# Patient Record
Sex: MALE | Race: WHITE
[De-identification: names, ages, dates, MRNs, and addresses within clinical notes are randomized per-mention and may not be internally consistent; named-entity substitution may affect disease eponyms.]

---

## 2017-07-31 ENCOUNTER — HOSPITAL ENCOUNTER (INPATIENT)
Dept: HOSPITAL 4 - SED | Age: 57
LOS: 3 days | Discharge: HOME | DRG: 720 | End: 2017-08-03
Payer: MEDICAID

## 2017-07-31 VITALS — SYSTOLIC BLOOD PRESSURE: 144 MMHG

## 2017-07-31 VITALS — HEIGHT: 71 IN | BODY MASS INDEX: 35 KG/M2 | WEIGHT: 250 LBS

## 2017-07-31 VITALS — SYSTOLIC BLOOD PRESSURE: 123 MMHG

## 2017-07-31 VITALS — SYSTOLIC BLOOD PRESSURE: 134 MMHG

## 2017-07-31 DIAGNOSIS — I10: ICD-10-CM

## 2017-07-31 DIAGNOSIS — E44.0: ICD-10-CM

## 2017-07-31 DIAGNOSIS — Z88.8: ICD-10-CM

## 2017-07-31 DIAGNOSIS — F41.9: ICD-10-CM

## 2017-07-31 DIAGNOSIS — F32.9: ICD-10-CM

## 2017-07-31 DIAGNOSIS — E11.610: ICD-10-CM

## 2017-07-31 DIAGNOSIS — Z88.0: ICD-10-CM

## 2017-07-31 DIAGNOSIS — A41.9: Primary | ICD-10-CM

## 2017-07-31 DIAGNOSIS — L03.115: ICD-10-CM

## 2017-07-31 DIAGNOSIS — Z88.1: ICD-10-CM

## 2017-07-31 DIAGNOSIS — E11.65: ICD-10-CM

## 2017-07-31 DIAGNOSIS — Z87.81: ICD-10-CM

## 2017-07-31 LAB
ALBUMIN SERPL BCP-MCNC: 3.3 G/DL (ref 3.4–4.8)
ALT SERPL W P-5'-P-CCNC: 20 U/L (ref 12–78)
AMYLASE SERPL-CCNC: 17 U/L (ref 0–100)
ANION GAP SERPL CALCULATED.3IONS-SCNC: 5 MMOL/L (ref 5–15)
AST SERPL W P-5'-P-CCNC: 14 U/L (ref 10–37)
BASOPHILS # BLD AUTO: 0.1 K/UL (ref 0–0.2)
BASOPHILS NFR BLD AUTO: 0.7 % (ref 0–2)
BILIRUB DIRECT SERPL-MCNC: 0.2 MG/DL (ref 0–0.3)
BILIRUB SERPL-MCNC: 0.5 MG/DL (ref 0–1)
BUN SERPL-MCNC: 12 MG/DL (ref 8–21)
CALCIUM SERPL-MCNC: 8.7 MG/DL (ref 8.4–11)
CHLORIDE SERPL-SCNC: 100 MMOL/L (ref 98–107)
CREAT SERPL-MCNC: 0.62 MG/DL (ref 0.55–1.3)
EOSINOPHIL # BLD AUTO: 0.2 K/UL (ref 0–0.4)
EOSINOPHIL NFR BLD AUTO: 1.7 % (ref 0–4)
ERYTHROCYTE [DISTWIDTH] IN BLOOD BY AUTOMATED COUNT: 13.9 % (ref 9–15)
GFR SERPL CREATININE-BSD FRML MDRD: 174 ML/MIN (ref 90–?)
GLUCOSE SERPL-MCNC: 274 MG/DL (ref 70–99)
HCT VFR BLD AUTO: 33.7 % (ref 36–54)
HGB BLD-MCNC: 11.1 G/DL (ref 14–18)
INR PPP: 1 (ref 0.8–1.2)
LIPASE SERPL-CCNC: 88 U/L (ref 73–393)
LYMPHOCYTES # BLD AUTO: 1.6 K/UL (ref 1–5.5)
LYMPHOCYTES NFR BLD AUTO: 11.1 % (ref 20.5–51.5)
MCH RBC QN AUTO: 27 PG (ref 27–31)
MCHC RBC AUTO-ENTMCNC: 33 % (ref 32–36)
MCV RBC AUTO: 81 FL (ref 79–98)
MONOCYTES # BLD MANUAL: 0.7 K/UL (ref 0–1)
MONOCYTES # BLD MANUAL: 4.7 % (ref 1.7–9.3)
NEUTROPHILS # BLD AUTO: 11.8 K/UL (ref 1.8–7.7)
NEUTROPHILS NFR BLD AUTO: 81.8 % (ref 40–70)
PHOSPHATE SERPL-MCNC: 2.8 MG/DL (ref 2.7–4.5)
PLATELET # BLD AUTO: 529 K/UL (ref 130–430)
POTASSIUM SERPL-SCNC: 4.1 MMOL/L (ref 3.5–5.1)
PROT SERPL-MCNC: 7.2 G/DL (ref 6.4–8.3)
PROTHROMBIN TIME: 10.5 SECS (ref 9.5–12.5)
RBC # BLD AUTO: 4.15 MIL/UL (ref 4.2–6.2)
SODIUM SERPLBLD-SCNC: 134 MMOL/L (ref 136–145)
T4 FREE SERPL-MCNC: 0.9 NG/DL (ref 0.6–1.6)
TSH SERPL DL<=0.05 MIU/L-ACNC: 1.31 UIU/ML (ref 0.34–4.82)
WBC # BLD AUTO: 14.4 K/UL (ref 4.8–10.8)

## 2017-07-31 RX ADMIN — ACETAMINOPHEN PRN MG: 325 TABLET ORAL at 20:44

## 2017-07-31 RX ADMIN — DOCUSATE SODIUM SCH MG: 100 CAPSULE, LIQUID FILLED ORAL at 20:45

## 2017-07-31 RX ADMIN — MORPHINE SULFATE PRN MG: 2 INJECTION, SOLUTION INTRAMUSCULAR; INTRAVENOUS at 22:01

## 2017-07-31 RX ADMIN — SODIUM CHLORIDE SCH MLS/HR: 9 INJECTION, SOLUTION INTRAVENOUS at 20:44

## 2017-08-01 VITALS — SYSTOLIC BLOOD PRESSURE: 143 MMHG

## 2017-08-01 VITALS — SYSTOLIC BLOOD PRESSURE: 146 MMHG

## 2017-08-01 VITALS — SYSTOLIC BLOOD PRESSURE: 126 MMHG

## 2017-08-01 VITALS — SYSTOLIC BLOOD PRESSURE: 142 MMHG

## 2017-08-01 VITALS — SYSTOLIC BLOOD PRESSURE: 132 MMHG

## 2017-08-01 VITALS — SYSTOLIC BLOOD PRESSURE: 135 MMHG

## 2017-08-01 LAB
ANION GAP SERPL CALCULATED.3IONS-SCNC: 5 MMOL/L (ref 5–15)
BASOPHILS # BLD AUTO: 0.1 K/UL (ref 0–0.2)
BASOPHILS NFR BLD AUTO: 0.8 % (ref 0–2)
BUN SERPL-MCNC: 11 MG/DL (ref 8–21)
CALCIUM SERPL-MCNC: 8.2 MG/DL (ref 8.4–11)
CHLORIDE SERPL-SCNC: 103 MMOL/L (ref 98–107)
CHOLEST SERPL-MCNC: 130 MG/DL (ref ?–200)
CREAT SERPL-MCNC: 0.55 MG/DL (ref 0.55–1.3)
EOSINOPHIL # BLD AUTO: 0.5 K/UL (ref 0–0.4)
EOSINOPHIL NFR BLD AUTO: 4.3 % (ref 0–4)
ERYTHROCYTE [DISTWIDTH] IN BLOOD BY AUTOMATED COUNT: 13.6 % (ref 9–15)
GFR SERPL CREATININE-BSD FRML MDRD: 200 ML/MIN (ref 90–?)
GLUCOSE SERPL-MCNC: 191 MG/DL (ref 70–99)
HCT VFR BLD AUTO: 31.4 % (ref 36–54)
HDLC SERPL-MCNC: 30 MG/DL (ref 45–?)
HGB BLD-MCNC: 10.3 G/DL (ref 14–18)
LDL CHOLESTEROL: 93 MG/DL (ref ?–100)
LYMPHOCYTES # BLD AUTO: 2.1 K/UL (ref 1–5.5)
LYMPHOCYTES NFR BLD AUTO: 18.4 % (ref 20.5–51.5)
MCH RBC QN AUTO: 27 PG (ref 27–31)
MCHC RBC AUTO-ENTMCNC: 33 % (ref 32–36)
MCV RBC AUTO: 82 FL (ref 79–98)
MONOCYTES # BLD MANUAL: 0.7 K/UL (ref 0–1)
MONOCYTES # BLD MANUAL: 6.3 % (ref 1.7–9.3)
NEUTROPHILS # BLD AUTO: 8.1 K/UL (ref 1.8–7.7)
NEUTROPHILS NFR BLD AUTO: 70.2 % (ref 40–70)
PHOSPHATE SERPL-MCNC: 4.3 MG/DL (ref 2.7–4.5)
PLATELET # BLD AUTO: 479 K/UL (ref 130–430)
POTASSIUM SERPL-SCNC: 4.3 MMOL/L (ref 3.5–5.1)
RBC # BLD AUTO: 3.84 MIL/UL (ref 4.2–6.2)
SODIUM SERPLBLD-SCNC: 136 MMOL/L (ref 136–145)
TRIGL SERPL-MCNC: 65 MG/DL (ref 30–150)
WBC # BLD AUTO: 11.5 K/UL (ref 4.8–10.8)

## 2017-08-01 RX ADMIN — DIPHENHYDRAMINE HYDROCHLORIDE PRN MG: 12.5 SOLUTION ORAL at 22:40

## 2017-08-01 RX ADMIN — CLINDAMYCIN PHOSPHATE SCH MLS/HR: 150 INJECTION, SOLUTION INTRAVENOUS at 05:38

## 2017-08-01 RX ADMIN — Medication SCH EA: at 20:28

## 2017-08-01 RX ADMIN — CLINDAMYCIN PHOSPHATE SCH MLS/HR: 150 INJECTION, SOLUTION INTRAVENOUS at 17:18

## 2017-08-01 RX ADMIN — CLINDAMYCIN PHOSPHATE SCH MLS/HR: 150 INJECTION, SOLUTION INTRAVENOUS at 23:41

## 2017-08-01 RX ADMIN — Medication SCH EA: at 11:12

## 2017-08-01 RX ADMIN — DOCUSATE SODIUM SCH MG: 100 CAPSULE, LIQUID FILLED ORAL at 09:27

## 2017-08-01 RX ADMIN — HUMAN INSULIN PRN UNITS: 100 INJECTION, SOLUTION SUBCUTANEOUS at 20:31

## 2017-08-01 RX ADMIN — DOCUSATE SODIUM SCH MG: 100 CAPSULE, LIQUID FILLED ORAL at 20:28

## 2017-08-01 RX ADMIN — MORPHINE SULFATE PRN MG: 2 INJECTION, SOLUTION INTRAMUSCULAR; INTRAVENOUS at 20:41

## 2017-08-01 RX ADMIN — Medication SCH EA: at 05:42

## 2017-08-01 RX ADMIN — CLINDAMYCIN PHOSPHATE SCH MLS/HR: 150 INJECTION, SOLUTION INTRAVENOUS at 13:00

## 2017-08-01 RX ADMIN — Medication SCH EA: at 16:44

## 2017-08-01 RX ADMIN — SODIUM CHLORIDE SCH MLS/HR: 9 INJECTION, SOLUTION INTRAVENOUS at 13:02

## 2017-08-01 RX ADMIN — HUMAN INSULIN PRN UNITS: 100 INJECTION, SOLUTION SUBCUTANEOUS at 17:14

## 2017-08-01 RX ADMIN — HUMAN INSULIN PRN UNITS: 100 INJECTION, SOLUTION SUBCUTANEOUS at 11:53

## 2017-08-01 RX ADMIN — ONDANSETRON PRN MG: 2 INJECTION INTRAMUSCULAR; INTRAVENOUS at 11:07

## 2017-08-01 RX ADMIN — HUMAN INSULIN PRN UNITS: 100 INJECTION, SOLUTION SUBCUTANEOUS at 05:56

## 2017-08-02 VITALS — SYSTOLIC BLOOD PRESSURE: 141 MMHG

## 2017-08-02 VITALS — SYSTOLIC BLOOD PRESSURE: 140 MMHG

## 2017-08-02 VITALS — SYSTOLIC BLOOD PRESSURE: 142 MMHG

## 2017-08-02 VITALS — SYSTOLIC BLOOD PRESSURE: 150 MMHG

## 2017-08-02 VITALS — SYSTOLIC BLOOD PRESSURE: 163 MMHG

## 2017-08-02 VITALS — SYSTOLIC BLOOD PRESSURE: 136 MMHG

## 2017-08-02 LAB
ANION GAP SERPL CALCULATED.3IONS-SCNC: 4 MMOL/L (ref 5–15)
BASOPHILS # BLD AUTO: 0.1 K/UL (ref 0–0.2)
BASOPHILS NFR BLD AUTO: 0.7 % (ref 0–2)
BUN SERPL-MCNC: 10 MG/DL (ref 8–21)
CALCIUM SERPL-MCNC: 8.6 MG/DL (ref 8.4–11)
CHLORIDE SERPL-SCNC: 101 MMOL/L (ref 98–107)
CREAT SERPL-MCNC: 0.53 MG/DL (ref 0.55–1.3)
EOSINOPHIL # BLD AUTO: 0.6 K/UL (ref 0–0.4)
EOSINOPHIL NFR BLD AUTO: 4.2 % (ref 0–4)
ERYTHROCYTE [DISTWIDTH] IN BLOOD BY AUTOMATED COUNT: 13.8 % (ref 9–15)
GFR SERPL CREATININE-BSD FRML MDRD: 208 ML/MIN (ref 90–?)
GLUCOSE SERPL-MCNC: 175 MG/DL (ref 70–99)
HBA1C MFR BLD: 11 % (ref 4.8–5.6)
HCT VFR BLD AUTO: 33.8 % (ref 36–54)
HGB BLD-MCNC: 11.2 G/DL (ref 14–18)
LYMPHOCYTES # BLD AUTO: 2.3 K/UL (ref 1–5.5)
LYMPHOCYTES NFR BLD AUTO: 17.2 % (ref 20.5–51.5)
MCH RBC QN AUTO: 27 PG (ref 27–31)
MCHC RBC AUTO-ENTMCNC: 33 % (ref 32–36)
MCV RBC AUTO: 81 FL (ref 79–98)
MONOCYTES # BLD MANUAL: 0.8 K/UL (ref 0–1)
MONOCYTES # BLD MANUAL: 6.1 % (ref 1.7–9.3)
NEUTROPHILS # BLD AUTO: 9.6 K/UL (ref 1.8–7.7)
NEUTROPHILS NFR BLD AUTO: 71.8 % (ref 40–70)
PHOSPHATE SERPL-MCNC: 4.3 MG/DL (ref 2.7–4.5)
PLATELET # BLD AUTO: 550 K/UL (ref 130–430)
POTASSIUM SERPL-SCNC: 4.1 MMOL/L (ref 3.5–5.1)
RBC # BLD AUTO: 4.19 MIL/UL (ref 4.2–6.2)
SODIUM SERPLBLD-SCNC: 135 MMOL/L (ref 136–145)
T3 UPTAKE: 30 % (ref 24–39)
T4 SERPL-MCNC: 8.4 UG/DL (ref 4.5–12)
WBC # BLD AUTO: 13.4 K/UL (ref 4.8–10.8)

## 2017-08-02 RX ADMIN — HUMAN INSULIN PRN UNITS: 100 INJECTION, SOLUTION SUBCUTANEOUS at 12:21

## 2017-08-02 RX ADMIN — Medication SCH EA: at 06:12

## 2017-08-02 RX ADMIN — Medication SCH EA: at 17:03

## 2017-08-02 RX ADMIN — MORPHINE SULFATE PRN MG: 2 INJECTION, SOLUTION INTRAMUSCULAR; INTRAVENOUS at 02:51

## 2017-08-02 RX ADMIN — CLINDAMYCIN PHOSPHATE SCH MLS/HR: 150 INJECTION, SOLUTION INTRAVENOUS at 17:52

## 2017-08-02 RX ADMIN — SODIUM CHLORIDE SCH MLS/HR: 9 INJECTION, SOLUTION INTRAVENOUS at 08:49

## 2017-08-02 RX ADMIN — DOCUSATE SODIUM SCH MG: 100 CAPSULE, LIQUID FILLED ORAL at 21:00

## 2017-08-02 RX ADMIN — HUMAN INSULIN PRN UNITS: 100 INJECTION, SOLUTION SUBCUTANEOUS at 06:13

## 2017-08-02 RX ADMIN — CLINDAMYCIN PHOSPHATE SCH MLS/HR: 150 INJECTION, SOLUTION INTRAVENOUS at 05:27

## 2017-08-02 RX ADMIN — HUMAN INSULIN PRN UNITS: 100 INJECTION, SOLUTION SUBCUTANEOUS at 17:08

## 2017-08-02 RX ADMIN — ACETAMINOPHEN PRN MG: 325 TABLET ORAL at 07:06

## 2017-08-02 RX ADMIN — CLINDAMYCIN PHOSPHATE SCH MLS/HR: 150 INJECTION, SOLUTION INTRAVENOUS at 12:30

## 2017-08-02 RX ADMIN — MORPHINE SULFATE PRN MG: 2 INJECTION, SOLUTION INTRAMUSCULAR; INTRAVENOUS at 21:50

## 2017-08-02 RX ADMIN — DOCUSATE SODIUM SCH MG: 100 CAPSULE, LIQUID FILLED ORAL at 08:42

## 2017-08-02 RX ADMIN — ONDANSETRON PRN MG: 2 INJECTION INTRAMUSCULAR; INTRAVENOUS at 08:54

## 2017-08-02 RX ADMIN — HUMAN INSULIN PRN UNITS: 100 INJECTION, SOLUTION SUBCUTANEOUS at 21:56

## 2017-08-02 RX ADMIN — LEVOFLOXACIN SCH MLS/HR: 5 INJECTION, SOLUTION INTRAVENOUS at 10:14

## 2017-08-02 RX ADMIN — ONDANSETRON PRN MG: 2 INJECTION INTRAMUSCULAR; INTRAVENOUS at 17:50

## 2017-08-02 RX ADMIN — Medication SCH EA: at 12:19

## 2017-08-02 RX ADMIN — Medication SCH EA: at 21:44

## 2017-08-03 ENCOUNTER — HOSPITAL ENCOUNTER (EMERGENCY)
Dept: HOSPITAL 4 - SED | Age: 57
Discharge: HOME | End: 2017-08-03
Payer: MEDICAID

## 2017-08-03 VITALS — SYSTOLIC BLOOD PRESSURE: 130 MMHG

## 2017-08-03 VITALS — WEIGHT: 275 LBS | HEIGHT: 71 IN | BODY MASS INDEX: 38.5 KG/M2

## 2017-08-03 VITALS — SYSTOLIC BLOOD PRESSURE: 147 MMHG

## 2017-08-03 VITALS — SYSTOLIC BLOOD PRESSURE: 140 MMHG

## 2017-08-03 VITALS — SYSTOLIC BLOOD PRESSURE: 125 MMHG

## 2017-08-03 VITALS — SYSTOLIC BLOOD PRESSURE: 135 MMHG

## 2017-08-03 VITALS — SYSTOLIC BLOOD PRESSURE: 131 MMHG

## 2017-08-03 DIAGNOSIS — Z88.1: ICD-10-CM

## 2017-08-03 DIAGNOSIS — Z79.84: ICD-10-CM

## 2017-08-03 DIAGNOSIS — E11.9: ICD-10-CM

## 2017-08-03 DIAGNOSIS — Z88.0: ICD-10-CM

## 2017-08-03 DIAGNOSIS — Z79.899: ICD-10-CM

## 2017-08-03 DIAGNOSIS — L03.115: Primary | ICD-10-CM

## 2017-08-03 LAB
ALBUMIN SERPL BCP-MCNC: 3.4 G/DL (ref 3.4–4.8)
ALT SERPL W P-5'-P-CCNC: 20 U/L (ref 12–78)
ANION GAP SERPL CALCULATED.3IONS-SCNC: 5 MMOL/L (ref 5–15)
ANION GAP SERPL CALCULATED.3IONS-SCNC: 9 MMOL/L (ref 5–15)
AST SERPL W P-5'-P-CCNC: 15 U/L (ref 10–37)
BASOPHILS # BLD AUTO: 0.1 K/UL (ref 0–0.2)
BASOPHILS # BLD AUTO: 0.2 K/UL (ref 0–0.2)
BASOPHILS NFR BLD AUTO: 0.7 % (ref 0–2)
BASOPHILS NFR BLD AUTO: 1 % (ref 0–2)
BILIRUB SERPL-MCNC: 0.6 MG/DL (ref 0–1)
BUN SERPL-MCNC: 11 MG/DL (ref 8–21)
BUN SERPL-MCNC: 13 MG/DL (ref 8–21)
CALCIUM SERPL-MCNC: 8.6 MG/DL (ref 8.4–11)
CALCIUM SERPL-MCNC: 9.2 MG/DL (ref 8.4–11)
CHLORIDE SERPL-SCNC: 101 MMOL/L (ref 98–107)
CHLORIDE SERPL-SCNC: 103 MMOL/L (ref 98–107)
CREAT SERPL-MCNC: 0.6 MG/DL (ref 0.55–1.3)
CREAT SERPL-MCNC: 0.71 MG/DL (ref 0.55–1.3)
EOSINOPHIL # BLD AUTO: 0.1 K/UL (ref 0–0.4)
EOSINOPHIL # BLD AUTO: 0.4 K/UL (ref 0–0.4)
EOSINOPHIL NFR BLD AUTO: 0.8 % (ref 0–4)
EOSINOPHIL NFR BLD AUTO: 3.4 % (ref 0–4)
ERYTHROCYTE [DISTWIDTH] IN BLOOD BY AUTOMATED COUNT: 13.9 % (ref 9–15)
ERYTHROCYTE [DISTWIDTH] IN BLOOD BY AUTOMATED COUNT: 14.1 % (ref 9–15)
GFR SERPL CREATININE-BSD FRML MDRD: 149 ML/MIN (ref 90–?)
GFR SERPL CREATININE-BSD FRML MDRD: 181 ML/MIN (ref 90–?)
GLUCOSE SERPL-MCNC: 156 MG/DL (ref 70–99)
GLUCOSE SERPL-MCNC: 185 MG/DL (ref 70–99)
HCT VFR BLD AUTO: 32.7 % (ref 36–54)
HCT VFR BLD AUTO: 38.3 % (ref 36–54)
HGB BLD-MCNC: 10.7 G/DL (ref 14–18)
HGB BLD-MCNC: 12.3 G/DL (ref 14–18)
INR PPP: 1.1 (ref 0.8–1.2)
LYMPHOCYTES # BLD AUTO: 1.7 K/UL (ref 1–5.5)
LYMPHOCYTES # BLD AUTO: 2.1 K/UL (ref 1–5.5)
LYMPHOCYTES NFR BLD AUTO: 17.7 % (ref 20.5–51.5)
LYMPHOCYTES NFR BLD AUTO: 9.9 % (ref 20.5–51.5)
MCH RBC QN AUTO: 26 PG (ref 27–31)
MCH RBC QN AUTO: 27 PG (ref 27–31)
MCHC RBC AUTO-ENTMCNC: 32 % (ref 32–36)
MCHC RBC AUTO-ENTMCNC: 33 % (ref 32–36)
MCV RBC AUTO: 82 FL (ref 79–98)
MCV RBC AUTO: 82 FL (ref 79–98)
MONOCYTES # BLD MANUAL: 0.7 K/UL (ref 0–1)
MONOCYTES # BLD MANUAL: 0.8 K/UL (ref 0–1)
MONOCYTES # BLD MANUAL: 3.9 % (ref 1.7–9.3)
MONOCYTES # BLD MANUAL: 6.7 % (ref 1.7–9.3)
NEUTROPHILS # BLD AUTO: 14.6 K/UL (ref 1.8–7.7)
NEUTROPHILS # BLD AUTO: 8.6 K/UL (ref 1.8–7.7)
NEUTROPHILS NFR BLD AUTO: 71.5 % (ref 40–70)
NEUTROPHILS NFR BLD AUTO: 84.4 % (ref 40–70)
PLATELET # BLD AUTO: 526 K/UL (ref 130–430)
PLATELET # BLD AUTO: 638 K/UL (ref 130–430)
POTASSIUM SERPL-SCNC: 4 MMOL/L (ref 3.5–5.1)
POTASSIUM SERPL-SCNC: 4.4 MMOL/L (ref 3.5–5.1)
PROT SERPL-MCNC: 7.7 G/DL (ref 6.4–8.3)
PROTHROMBIN TIME: 11.5 SECS (ref 9.5–12.5)
RBC # BLD AUTO: 3.98 MIL/UL (ref 4.2–6.2)
RBC # BLD AUTO: 4.69 MIL/UL (ref 4.2–6.2)
SODIUM SERPLBLD-SCNC: 136 MMOL/L (ref 136–145)
SODIUM SERPLBLD-SCNC: 137 MMOL/L (ref 136–145)
WBC # BLD AUTO: 12 K/UL (ref 4.8–10.8)
WBC # BLD AUTO: 17.3 K/UL (ref 4.8–10.8)

## 2017-08-03 RX ADMIN — MORPHINE SULFATE PRN MG: 2 INJECTION, SOLUTION INTRAMUSCULAR; INTRAVENOUS at 04:49

## 2017-08-03 RX ADMIN — DOCUSATE SODIUM SCH MG: 100 CAPSULE, LIQUID FILLED ORAL at 09:00

## 2017-08-03 RX ADMIN — DOCUSATE SODIUM SCH MG: 100 CAPSULE, LIQUID FILLED ORAL at 09:25

## 2017-08-03 RX ADMIN — CLINDAMYCIN PHOSPHATE SCH MLS/HR: 150 INJECTION, SOLUTION INTRAVENOUS at 01:21

## 2017-08-03 RX ADMIN — HUMAN INSULIN PRN UNITS: 100 INJECTION, SOLUTION SUBCUTANEOUS at 06:31

## 2017-08-03 RX ADMIN — SODIUM CHLORIDE SCH MLS/HR: 9 INJECTION, SOLUTION INTRAVENOUS at 04:47

## 2017-08-03 RX ADMIN — CLINDAMYCIN PHOSPHATE SCH MLS/HR: 150 INJECTION, SOLUTION INTRAVENOUS at 06:23

## 2017-08-03 RX ADMIN — Medication SCH EA: at 06:28

## 2017-08-03 RX ADMIN — LEVOFLOXACIN SCH MLS/HR: 5 INJECTION, SOLUTION INTRAVENOUS at 09:25

## 2017-08-03 RX ADMIN — DIPHENHYDRAMINE HYDROCHLORIDE PRN MG: 12.5 SOLUTION ORAL at 01:24

## 2018-01-04 ENCOUNTER — HOSPITAL ENCOUNTER (INPATIENT)
Dept: HOSPITAL 26 - MED | Age: 58
LOS: 3 days | Discharge: NURSING, REHAB OR LONG-TERM CARE | DRG: 469 | End: 2018-01-07
Payer: MEDICAID

## 2018-01-04 VITALS — DIASTOLIC BLOOD PRESSURE: 72 MMHG | SYSTOLIC BLOOD PRESSURE: 136 MMHG

## 2018-01-04 VITALS — HEIGHT: 71 IN | BODY MASS INDEX: 31.22 KG/M2 | WEIGHT: 223 LBS

## 2018-01-04 DIAGNOSIS — Z79.4: ICD-10-CM

## 2018-01-04 DIAGNOSIS — Z86.19: ICD-10-CM

## 2018-01-04 DIAGNOSIS — E11.22: ICD-10-CM

## 2018-01-04 DIAGNOSIS — J44.9: ICD-10-CM

## 2018-01-04 DIAGNOSIS — D64.9: ICD-10-CM

## 2018-01-04 DIAGNOSIS — I50.9: ICD-10-CM

## 2018-01-04 DIAGNOSIS — K58.0: ICD-10-CM

## 2018-01-04 DIAGNOSIS — Z89.511: ICD-10-CM

## 2018-01-04 DIAGNOSIS — D68.59: ICD-10-CM

## 2018-01-04 DIAGNOSIS — E86.0: ICD-10-CM

## 2018-01-04 DIAGNOSIS — F32.9: ICD-10-CM

## 2018-01-04 DIAGNOSIS — E44.0: ICD-10-CM

## 2018-01-04 DIAGNOSIS — N18.9: ICD-10-CM

## 2018-01-04 DIAGNOSIS — E66.9: ICD-10-CM

## 2018-01-04 DIAGNOSIS — N17.0: Primary | ICD-10-CM

## 2018-01-04 DIAGNOSIS — Z91.018: ICD-10-CM

## 2018-01-04 DIAGNOSIS — I13.0: ICD-10-CM

## 2018-01-04 DIAGNOSIS — Z88.1: ICD-10-CM

## 2018-01-04 DIAGNOSIS — E78.5: ICD-10-CM

## 2018-01-04 NOTE — NUR
57Y/M BIBA C/O DIARRHEA. 

ABD IS ROUND, SOFT, NON TENDER, HYPERACTIVE BS X4. PT STATES HE HAS HAD 
DIARRHEA FOR A 'FEW MONTTHS". PT STATES HE HAS BEEN ORAL VANCO AT [facility] 
W/ NO RELIEF OF SYMPTOMS. PT IS NO LONGER TAKING VANCO AT THIS TIME. 

HX DM, COPD, CHF, AMPUTEE ABOVE KNEE RT LEG. 

ALLERGY TO ERYTHROMYCIN, AND "NUTRISWEET" OR "DIET SUGAR PRODUCTS"

## 2018-01-05 VITALS — DIASTOLIC BLOOD PRESSURE: 67 MMHG | SYSTOLIC BLOOD PRESSURE: 133 MMHG

## 2018-01-05 VITALS — DIASTOLIC BLOOD PRESSURE: 71 MMHG | SYSTOLIC BLOOD PRESSURE: 141 MMHG

## 2018-01-05 VITALS — SYSTOLIC BLOOD PRESSURE: 142 MMHG | DIASTOLIC BLOOD PRESSURE: 85 MMHG

## 2018-01-05 VITALS — DIASTOLIC BLOOD PRESSURE: 77 MMHG | SYSTOLIC BLOOD PRESSURE: 154 MMHG

## 2018-01-05 LAB
ALBUMIN FLD-MCNC: 3.2 G/DL (ref 3.4–5)
AMYLASE SERPL-CCNC: 36 U/L (ref 25–115)
ANION GAP SERPL CALCULATED.3IONS-SCNC: 12.2 MMOL/L (ref 8–16)
ANION GAP SERPL CALCULATED.3IONS-SCNC: 13.9 MMOL/L (ref 8–16)
APPEARANCE UR: CLEAR
AST SERPL-CCNC: 14 U/L (ref 15–37)
BARBITURATES UR QL SCN: (no result) NG/ML
BASOPHILS # BLD AUTO: 0.2 K/UL (ref 0–0.22)
BASOPHILS # BLD AUTO: 0.3 K/UL (ref 0–0.22)
BASOPHILS NFR BLD AUTO: 2.1 % (ref 0–2)
BASOPHILS NFR BLD AUTO: 2.7 % (ref 0–2)
BENZODIAZ UR QL SCN: (no result) NG/ML
BILIRUB SERPL-MCNC: 0.2 MG/DL (ref 0–1)
BILIRUB UR QL STRIP: NEGATIVE
BUN SERPL-MCNC: 31 MG/DL (ref 7–18)
BUN SERPL-MCNC: 31 MG/DL (ref 7–18)
BZE UR QL SCN: (no result) NG/ML
CANNABINOIDS UR QL SCN: (no result) NG/ML
CHLORIDE SERPL-SCNC: 105 MMOL/L (ref 98–107)
CHLORIDE SERPL-SCNC: 106 MMOL/L (ref 98–107)
CHOLEST/HDLC SERPL: 5.1 {RATIO} (ref 1–4.5)
CO2 SERPL-SCNC: 25.1 MMOL/L (ref 21–32)
CO2 SERPL-SCNC: 25.9 MMOL/L (ref 21–32)
COLOR UR: YELLOW
CREAT SERPL-MCNC: 1.2 MG/DL (ref 0.7–1.3)
CREAT SERPL-MCNC: 1.7 MG/DL (ref 0.7–1.3)
EOSINOPHIL # BLD AUTO: 0.4 K/UL (ref 0–0.4)
EOSINOPHIL # BLD AUTO: 0.4 K/UL (ref 0–0.4)
EOSINOPHIL NFR BLD AUTO: 3.4 % (ref 0–4)
EOSINOPHIL NFR BLD AUTO: 5 % (ref 0–4)
ERYTHROCYTE [DISTWIDTH] IN BLOOD BY AUTOMATED COUNT: 15.4 % (ref 11.6–13.7)
ERYTHROCYTE [DISTWIDTH] IN BLOOD BY AUTOMATED COUNT: 15.6 % (ref 11.6–13.7)
GFR SERPL CREATININE-BSD FRML MDRD: 54 ML/MIN (ref 90–?)
GFR SERPL CREATININE-BSD FRML MDRD: 81 ML/MIN (ref 90–?)
GLUCOSE SERPL-MCNC: 125 MG/DL (ref 74–106)
GLUCOSE SERPL-MCNC: 127 MG/DL (ref 74–106)
GLUCOSE UR STRIP-MCNC: NEGATIVE MG/DL
HCT VFR BLD AUTO: 34.3 % (ref 36–52)
HCT VFR BLD AUTO: 34.3 % (ref 36–52)
HDLC SERPL-MCNC: 31 MG/DL (ref 40–60)
HGB BLD-MCNC: 10.9 G/DL (ref 12–18)
HGB BLD-MCNC: 11.1 G/DL (ref 12–18)
HGB UR QL STRIP: NEGATIVE
LDLC SERPL CALC-MCNC: 83 MG/DL (ref 60–100)
LEUKOCYTE ESTERASE UR QL STRIP: NEGATIVE
LIPASE SERPL-CCNC: 242 U/L (ref 73–393)
LYMPHOCYTES # BLD AUTO: 1.4 K/UL (ref 2–11.5)
LYMPHOCYTES # BLD AUTO: 1.6 K/UL (ref 2–11.5)
LYMPHOCYTES NFR BLD AUTO: 15.3 % (ref 20.5–51.1)
LYMPHOCYTES NFR BLD AUTO: 16.6 % (ref 20.5–51.1)
MAGNESIUM SERPL-MCNC: 2 MG/DL (ref 1.8–2.4)
MAGNESIUM SERPL-MCNC: 2 MG/DL (ref 1.8–2.4)
MCH RBC QN AUTO: 27 PG (ref 27–31)
MCH RBC QN AUTO: 27 PG (ref 27–31)
MCHC RBC AUTO-ENTMCNC: 32 G/DL (ref 33–37)
MCHC RBC AUTO-ENTMCNC: 32 G/DL (ref 33–37)
MCV RBC AUTO: 84 FL (ref 80–94)
MCV RBC AUTO: 84 FL (ref 80–94)
MONOCYTES # BLD AUTO: 0.8 K/UL (ref 0.8–1)
MONOCYTES # BLD AUTO: 0.9 K/UL (ref 0.8–1)
MONOCYTES NFR BLD AUTO: 8.1 % (ref 1.7–9.3)
MONOCYTES NFR BLD AUTO: 8.9 % (ref 1.7–9.3)
NEUTROPHILS # BLD AUTO: 5.9 K/UL (ref 1.8–7.7)
NEUTROPHILS # BLD AUTO: 7.5 K/UL (ref 1.8–7.7)
NEUTROPHILS NFR BLD AUTO: 67.4 % (ref 42.2–75.2)
NEUTROPHILS NFR BLD AUTO: 70.5 % (ref 42.2–75.2)
NITRITE UR QL STRIP: NEGATIVE
OPIATES UR QL SCN: (no result) NG/ML
PCP UR QL SCN: (no result) NG/ML
PH UR STRIP: 6.5 [PH] (ref 5–9)
PHOSPHATE SERPL-MCNC: 4.5 MG/DL (ref 2.5–4.9)
PHOSPHATE SERPL-MCNC: 4.8 MG/DL (ref 2.5–4.9)
PLATELET # BLD AUTO: 277 K/UL (ref 140–450)
PLATELET # BLD AUTO: 315 K/UL (ref 140–450)
POTASSIUM SERPL-SCNC: 4 MMOL/L (ref 3.5–5.1)
POTASSIUM SERPL-SCNC: 4.1 MMOL/L (ref 3.5–5.1)
PROTHROMBIN TIME: 9.9 SECS (ref 10.8–13.4)
RBC # BLD AUTO: 4.06 MIL/UL (ref 4.2–6.1)
RBC # BLD AUTO: 4.1 MIL/UL (ref 4.2–6.1)
SODIUM SERPL-SCNC: 140 MMOL/L (ref 136–145)
SODIUM SERPL-SCNC: 140 MMOL/L (ref 136–145)
T4 FREE SERPL-MCNC: 0.93 NG/DL (ref 0.76–1.46)
TRIGL SERPL-MCNC: 225 MG/DL (ref 30–150)
TSH SERPL DL<=0.05 MIU/L-ACNC: 3.03 UIU/ML (ref 0.34–3.74)
WBC # BLD AUTO: 10.7 K/UL (ref 4.8–10.8)
WBC # BLD AUTO: 8.7 K/UL (ref 4.8–10.8)

## 2018-01-05 RX ADMIN — AMITRIPTYLINE HYDROCHLORIDE SCH MG: 25 TABLET, FILM COATED ORAL at 21:57

## 2018-01-05 RX ADMIN — SODIUM CHLORIDE SCH MLS/HR: 9 INJECTION, SOLUTION INTRAVENOUS at 03:40

## 2018-01-05 RX ADMIN — FERROUS SULFATE TAB 325 MG (65 MG ELEMENTAL FE) SCH MG: 325 (65 FE) TAB at 17:22

## 2018-01-05 RX ADMIN — DEXTROSE SCH MG: 50 INJECTION, SOLUTION INTRAVENOUS at 17:22

## 2018-01-05 RX ADMIN — Medication SCH DEV: at 12:19

## 2018-01-05 RX ADMIN — Medication SCH DEV: at 06:22

## 2018-01-05 RX ADMIN — Medication SCH EACH: at 09:00

## 2018-01-05 RX ADMIN — DEXTROSE SCH MG: 50 INJECTION, SOLUTION INTRAVENOUS at 12:18

## 2018-01-05 RX ADMIN — MORPHINE SULFATE PRN MG: 2 INJECTION, SOLUTION INTRAMUSCULAR; INTRAVENOUS at 21:30

## 2018-01-05 RX ADMIN — METRONIDAZOLE SCH MLS/HR: 500 SOLUTION INTRAVENOUS at 13:54

## 2018-01-05 RX ADMIN — Medication SCH DEV: at 21:00

## 2018-01-05 RX ADMIN — Medication SCH DEV: at 17:22

## 2018-01-05 RX ADMIN — Medication SCH MG: at 17:22

## 2018-01-05 RX ADMIN — SODIUM CHLORIDE SCH MLS/HR: 9 INJECTION, SOLUTION INTRAVENOUS at 12:18

## 2018-01-05 RX ADMIN — MORPHINE SULFATE PRN MG: 2 INJECTION, SOLUTION INTRAMUSCULAR; INTRAVENOUS at 14:43

## 2018-01-05 RX ADMIN — SODIUM CHLORIDE SCH MLS/HR: 9 INJECTION, SOLUTION INTRAVENOUS at 21:59

## 2018-01-05 RX ADMIN — POLYETHYLENE GLYCOL 3350 SCH GM: 17 POWDER, FOR SOLUTION ORAL at 21:00

## 2018-01-05 RX ADMIN — DEXTROSE SCH MG: 50 INJECTION, SOLUTION INTRAVENOUS at 06:22

## 2018-01-05 RX ADMIN — METRONIDAZOLE SCH MLS/HR: 500 SOLUTION INTRAVENOUS at 21:31

## 2018-01-05 RX ADMIN — ZOLPIDEM TARTRATE SCH MG: 5 TABLET ORAL at 21:57

## 2018-01-05 NOTE — NUR
PT ARRIVED TO UNIT VIA GURNEY. RECEIVED REPORT FROM ER RN CÉSAR, PT A/OX4 ON ROOM AIR. IV 
TO LEFT AC 20G INFUSING NS@100. ERYTHEMA AT ABDOMINAL/PERINEAL SKIN FOLDS, OTHERWISE SKIN 
INTACT. VITAL SIGNS WNL, UPDATED BOARD. ORIENTED PT TO UNIT. OBTAINED MRSA SWAB AND PLACED 
TELE MONITOR. PT IN STABLE CONDITION, NO SIGNS OF DISTRESS NOTED. BED IN LOW POSITION, CALL 
LIGHT WITHIN REACH. WILL CONTINUE TO MONITOR.

## 2018-01-05 NOTE — NUR
PT COMPLAINING OF PAIN AT IV SITE AND REQUESTED IV TO BE MOVED SOMEWHERE ELSE. STARTED NEW 
20G IV TO LEFT FOREARM. PT TOLERATED WELL. REMOVED IV AT LEFT AC, CANULA INTACT.

## 2018-01-05 NOTE — NUR
Patient will be admitted to care of DR. ABBASI. Admited to TELE.  Will go to 
rooM 116. Belongings list completed.  Report to ESTEPHANIE

## 2018-01-05 NOTE — NUR
PATIENT IS CURRENTLY AWAKE ALERT ORIENTED IVF INFUSING WELL IV SITE PATENT PATIENT IS 
CURRENTLY WATCHING TV.PATIENT IS CURRENTLY CLEAN AND DRY. DRINKING WATER AND ABLE TO MAKE 
NEEDS KNOWN.CALL LIGHT WITHIN REACH WILL CONTINUE TO MONITOR.

## 2018-01-05 NOTE — NUR
CLYDE NOTE

FAXED CLINICAL UPDATE TO [facility] [phone]. PER ROSCOE OF [facility] PH# 
[phone], PATIENT CAN GO BACK TO [facility] [address] WHEN READY FOR DISCHARGE AS LONG AS NO 
ISOLATION.

PER CLYDE KNAPP OF [facility] PH# [phone], SHE WILL WORK ON THE AUTHORIZATION FOR [facility] 
AND MEDICAL TRANSPORT AUTHORIZATION FOR [place] PH# [phone].  I GAVE HER THE NUMBER TO 
THE CHARGE NURSE ON THE NURSING FLOOR WHERE THE PATIENT IS IN CASE THE AUTHORIZATIONS WILL 
BE AVAILABLE AT A LATER TIME.

CHARGE NURSE HERBERT MORILLO.

## 2018-01-05 NOTE — NUR
EDUCATION GIVEN ON HIS ROUTINE NIGHTTIME MEDICATIONS PATIENT VERBALIZES UNDERSTANDING AND 
TOOK ALL HIS ROUTINE MEDS EXCEPT FOR MIRALAX PATIENT REFUSED IT.NEED MET WILL CONTINUE TO 
MONITOR.

## 2018-01-05 NOTE — NUR
PATIENT HAS BEEN SCREENED AND CATEGORIZED AS HIGH NUTRITION RISK.  PT WILL BE SEEN WITHIN 
1-2 DAYS OF ADMISSION.



1/5/18-1/6/18



AWA [address]

## 2018-01-05 NOTE — NUR
PT UNABLE TO PROVIDE STOOL SAMPLE AT THIS TIME, PT STATES HE "DOSNT HAVE TO GO" 
AND ONLY HAS FLATUS.

## 2018-01-05 NOTE — NUR
RECEIVED REPORT FROM NIGHT SHIFT RN. PATIENT IS ASLEEP BUT EASILY AWAKES. ORIENTED X4. 
RESPIRATORY EFFORT EVEN AND UNLABORED. NO SIGNS AND SYMPTOMS OF ACUTE DISTRESS NOTED AT THIS 
TIME. PATIENT HAS IV TO LEFT FA 20G INFUSING NS  ML/HR. SITE IS CLEAN, DRY, PATENT AND 
INTACT. DISCUSSED PLAN OF CARE WITH PATIENT AND HE VERBALIZED UNDERSTANDING. BED IN LOWEST 
POSITION, SIDE RAILS UP X2, CALL LIGHT PLACED WITHIN REACH. WILL CONTINUE TO MONITOR.

## 2018-01-05 NOTE — NUR
CLYDE NOTE

PER  STEVE, REVIEWS SHOULD ONLY BE SENT TO [facility].

INITIAL REVIEW FAXED TO [facility] [phone] # [phone].

-------------------------------------------------------------------------------

Addendum: 01/05/18 at 1127 by Camille Burris 

-------------------------------------------------------------------------------

SHIV KNAPP # [phone]

## 2018-01-06 VITALS — DIASTOLIC BLOOD PRESSURE: 71 MMHG | SYSTOLIC BLOOD PRESSURE: 130 MMHG

## 2018-01-06 VITALS — SYSTOLIC BLOOD PRESSURE: 129 MMHG | DIASTOLIC BLOOD PRESSURE: 76 MMHG

## 2018-01-06 VITALS — SYSTOLIC BLOOD PRESSURE: 140 MMHG | DIASTOLIC BLOOD PRESSURE: 76 MMHG

## 2018-01-06 LAB
ANION GAP SERPL CALCULATED.3IONS-SCNC: 10.8 MMOL/L (ref 8–16)
BASOPHILS # BLD AUTO: 0.1 K/UL (ref 0–0.22)
BASOPHILS NFR BLD AUTO: 2 % (ref 0–2)
BUN SERPL-MCNC: 19 MG/DL (ref 7–18)
CHLORIDE SERPL-SCNC: 107 MMOL/L (ref 98–107)
CO2 SERPL-SCNC: 26.3 MMOL/L (ref 21–32)
CREAT SERPL-MCNC: 0.9 MG/DL (ref 0.7–1.3)
EOSINOPHIL # BLD AUTO: 0.3 K/UL (ref 0–0.4)
EOSINOPHIL NFR BLD AUTO: 5 % (ref 0–4)
ERYTHROCYTE [DISTWIDTH] IN BLOOD BY AUTOMATED COUNT: 15.3 % (ref 11.6–13.7)
GFR SERPL CREATININE-BSD FRML MDRD: 113 ML/MIN (ref 90–?)
GLUCOSE SERPL-MCNC: 103 MG/DL (ref 74–106)
HCT VFR BLD AUTO: 34.4 % (ref 36–52)
HGB BLD-MCNC: 11.3 G/DL (ref 12–18)
LYMPHOCYTES # BLD AUTO: 1.5 K/UL (ref 2–11.5)
LYMPHOCYTES NFR BLD AUTO: 21.7 % (ref 20.5–51.1)
MAGNESIUM SERPL-MCNC: 2 MG/DL (ref 1.8–2.4)
MCH RBC QN AUTO: 27 PG (ref 27–31)
MCHC RBC AUTO-ENTMCNC: 33 G/DL (ref 33–37)
MCV RBC AUTO: 83 FL (ref 80–94)
MONOCYTES # BLD AUTO: 0.6 K/UL (ref 0.8–1)
MONOCYTES NFR BLD AUTO: 9.2 % (ref 1.7–9.3)
NEUTROPHILS # BLD AUTO: 4.3 K/UL (ref 1.8–7.7)
NEUTROPHILS NFR BLD AUTO: 62.1 % (ref 42.2–75.2)
PHOSPHATE SERPL-MCNC: 5.3 MG/DL (ref 2.5–4.9)
PLATELET # BLD AUTO: 261 K/UL (ref 140–450)
POTASSIUM SERPL-SCNC: 4.1 MMOL/L (ref 3.5–5.1)
RBC # BLD AUTO: 4.16 MIL/UL (ref 4.2–6.1)
SODIUM SERPL-SCNC: 140 MMOL/L (ref 136–145)
T3RU NFR SERPL: 27 % (ref 24–39)
T4 SERPL-MCNC: 7.1 UG/DL (ref 4.5–12)
WBC # BLD AUTO: 6.8 K/UL (ref 4.8–10.8)

## 2018-01-06 RX ADMIN — DEXTROSE SCH MG: 50 INJECTION, SOLUTION INTRAVENOUS at 13:26

## 2018-01-06 RX ADMIN — Medication SCH DEV: at 21:12

## 2018-01-06 RX ADMIN — Medication SCH MG: at 15:43

## 2018-01-06 RX ADMIN — SENNOSIDES A AND B SCH MG: 8.6 TABLET, FILM COATED ORAL at 17:24

## 2018-01-06 RX ADMIN — Medication SCH MG: at 10:42

## 2018-01-06 RX ADMIN — POLYETHYLENE GLYCOL 3350 SCH GM: 17 POWDER, FOR SOLUTION ORAL at 21:08

## 2018-01-06 RX ADMIN — MORPHINE SULFATE PRN MG: 2 INJECTION, SOLUTION INTRAMUSCULAR; INTRAVENOUS at 15:44

## 2018-01-06 RX ADMIN — VALSARTAN SCH MG: 80 TABLET ORAL at 09:24

## 2018-01-06 RX ADMIN — Medication SCH GM: at 09:24

## 2018-01-06 RX ADMIN — SENNOSIDES A AND B SCH MG: 8.6 TABLET, FILM COATED ORAL at 12:49

## 2018-01-06 RX ADMIN — LACTULOSE SCH GM: 10 SOLUTION ORAL at 21:10

## 2018-01-06 RX ADMIN — POLYETHYLENE GLYCOL 3350 SCH GM: 17 POWDER, FOR SOLUTION ORAL at 09:00

## 2018-01-06 RX ADMIN — Medication SCH DEV: at 16:38

## 2018-01-06 RX ADMIN — PANTOPRAZOLE SODIUM SCH MG: 40 TABLET, DELAYED RELEASE ORAL at 06:10

## 2018-01-06 RX ADMIN — DEXTROSE SCH MG: 50 INJECTION, SOLUTION INTRAVENOUS at 17:25

## 2018-01-06 RX ADMIN — Medication SCH GM: at 15:43

## 2018-01-06 RX ADMIN — ZOLPIDEM TARTRATE SCH MG: 5 TABLET ORAL at 21:08

## 2018-01-06 RX ADMIN — DEXTROSE SCH MG: 50 INJECTION, SOLUTION INTRAVENOUS at 00:35

## 2018-01-06 RX ADMIN — METOPROLOL SUCCINATE SCH MG: 50 TABLET, EXTENDED RELEASE ORAL at 09:25

## 2018-01-06 RX ADMIN — METRONIDAZOLE SCH MLS/HR: 500 SOLUTION INTRAVENOUS at 05:25

## 2018-01-06 RX ADMIN — METRONIDAZOLE SCH MLS/HR: 500 SOLUTION INTRAVENOUS at 12:48

## 2018-01-06 RX ADMIN — Medication SCH MG: at 09:25

## 2018-01-06 RX ADMIN — POTASSIUM CHLORIDE SCH MEQ: 750 TABLET, FILM COATED, EXTENDED RELEASE ORAL at 09:24

## 2018-01-06 RX ADMIN — MORPHINE SULFATE PRN MG: 2 INJECTION, SOLUTION INTRAMUSCULAR; INTRAVENOUS at 10:42

## 2018-01-06 RX ADMIN — Medication SCH MG: at 06:10

## 2018-01-06 RX ADMIN — Medication SCH DEV: at 06:14

## 2018-01-06 RX ADMIN — DEXTROSE SCH MG: 50 INJECTION, SOLUTION INTRAVENOUS at 06:09

## 2018-01-06 RX ADMIN — FERROUS SULFATE TAB 325 MG (65 MG ELEMENTAL FE) SCH MG: 325 (65 FE) TAB at 17:25

## 2018-01-06 RX ADMIN — METRONIDAZOLE SCH MLS/HR: 500 SOLUTION INTRAVENOUS at 20:57

## 2018-01-06 RX ADMIN — AMITRIPTYLINE HYDROCHLORIDE SCH MG: 25 TABLET, FILM COATED ORAL at 21:08

## 2018-01-06 RX ADMIN — Medication SCH EACH: at 09:24

## 2018-01-06 RX ADMIN — SERTRALINE HYDROCHLORIDE SCH MG: 50 TABLET ORAL at 09:24

## 2018-01-06 RX ADMIN — SODIUM CHLORIDE SCH MLS/HR: 9 INJECTION, SOLUTION INTRAVENOUS at 06:10

## 2018-01-06 RX ADMIN — FERROUS SULFATE TAB 325 MG (65 MG ELEMENTAL FE) SCH MG: 325 (65 FE) TAB at 09:24

## 2018-01-06 RX ADMIN — Medication SCH DEV: at 11:31

## 2018-01-06 RX ADMIN — ATORVASTATIN CALCIUM SCH MG: 20 TABLET, FILM COATED ORAL at 10:07

## 2018-01-06 NOTE — NUR
PATIENT IS CURRENTLY SITTING IN BED WATCHING TV PATIENT IS DRINKING THE GOLYTELY SLOWLY AND 
PATIENT KNOWS WHY HE IS TAKING THE GOLYTELY AND IS ABLE TO MAKE NEEDS KNOWN IF HE WANTS TO 
BE CLEANED UP PATIENT IS AWARE THAT HE WILL BE HAVING A PROCEDURE TOMORROW AND KNOWS HIS 
COLON NEEDS TO BE CLEAN FOR THE PROCEDURE.IVF INFUSING WELL IV SITE PATENT NO INFILTRATION 
NOTED.PATIENT DENIES PAIN AT THIS TIME AS WELL.PATIENT IS CALM AND VERY COOPERATIVE.VITAL 
SIGNS TAKEN AND CURRENTLY WNL PATIENT REMAINS AFEBRILE. CALL LIGHT WITHIN REACH.

## 2018-01-06 NOTE — NUR
PATIENT WAS CLEANED TURNED AND REPOSITIONED WITH THE ASSISTANCE OF CNQUIRINO WALKER AND CNA 
RUSH STOOL IS STILL NOT CLEAR YET. PATIENT NEEDS MET WILL CONTINUE TO MONITOR.

## 2018-01-06 NOTE — NUR
PATIENT IS CURRENTLY RESTING IN BED SLEEPING WAS AWAKENED FOR VITAL SIGNS WILL CONTINUE TO 
MONITOR.CALL LIGHT WITHIN REACH.

## 2018-01-06 NOTE — NUR
MD RESIDENT INFORMED THAT ACCORDING TO THE FLU VACCINE CRITERIA HE QUALIFIES FOR FLU VACCINE 
SO MD SAID,"I WILL BE PUTTING ORDERS IN." WILL CONTINUE TO MONITOR.

## 2018-01-06 NOTE — NUR
PATIENT STABLE SLEEPING IN BED. REPORT ENDORSED TO SHAW NELSON SHE WILL RESUME CARE OF THE 
PATIENT.

## 2018-01-06 NOTE — NUR
PATIENT CURRENTLY SLEEPING NEEDS MET DENIES PAIN AND DISCOMFORT NEEDS MET WILL CONTINUE TO 
MONITOR.

## 2018-01-06 NOTE — NUR
PATIENT STABLE RESTING IN BED WILL CONTINUE TO MONITOR.SLEEPING ON AND OFF.CALL LIGHT WITHIN 
REACH WILL CONTINUE TO MONITOR.

## 2018-01-06 NOTE — NUR
PATIENT ASLEEP AT THIS TIME IVF INFUSING WELL IV SITE PATENT NO PAIN OR DISCOMFORT 
NOTED.CONTINUES TO BE KEPT CLEAN AND DRY AS MUCH AS POSSIBLE PATIENT WILL BE NPO AFTER 
MIDNIGHT PATIENT VERBALIZES UNDERSTANDING.CALL LIGHT WITHIN REACH.

## 2018-01-06 NOTE — NUR
REPORT RECEIVED FROM NIGHT SHIFT, PT SLEEPING QUIETLY IN NAD, RESP EVEN UNLABORED, SKIN 
WARM, DRY COLOR WNL, PT AROUSED EASILY, NO C/O PAIN OR DISCOMFORT, PLAN OF CARE REVIEWED, NO 
IMMEDIATE NEEDS IDENTIFIED, CALL BELL WITHIN REACH, SIDE RAILS UPX2, BED LOCKED IN LOW 
POSITION, WILL CONTINUE TO MONITOR.

## 2018-01-06 NOTE — NUR
PATIENT SLEEPING COMFORTABLY IN BED NO PAIN OR DISCOMFORT NOTED IVF INFUSING WELL WILL 
CONTINUE TO MONITOR.CALL LIGHT WITHIN REACH.

## 2018-01-06 NOTE — NUR
PATIENT STABLE SITTING IN BED VERY COMPLAINT DRINKING GOLYTELY WELL AND TOLERATING IT WELL. 
CONTINUES TO BE CHANGED AS NEEDED. PATIENT DENIES PAIN AT THIS TIME. PATIENT TOOK HIS 
ROUTINE MEDS WELL. BLOOD SUGAR CHECK DONE CURRENTLY 81 PATIENT ENCOURAGED TO DRINK 
APPLEJUICE OR CRANBERRY JUICE AND WATER UNTIL MIDNIGHT PATIENT VERBALIZES UNDERSTANDING.CALL 
LIGHT WITHIN REACH.

## 2018-01-07 VITALS — SYSTOLIC BLOOD PRESSURE: 135 MMHG | DIASTOLIC BLOOD PRESSURE: 68 MMHG

## 2018-01-07 VITALS — DIASTOLIC BLOOD PRESSURE: 70 MMHG | SYSTOLIC BLOOD PRESSURE: 139 MMHG

## 2018-01-07 VITALS — DIASTOLIC BLOOD PRESSURE: 86 MMHG | SYSTOLIC BLOOD PRESSURE: 149 MMHG

## 2018-01-07 VITALS — SYSTOLIC BLOOD PRESSURE: 133 MMHG | DIASTOLIC BLOOD PRESSURE: 73 MMHG

## 2018-01-07 VITALS — SYSTOLIC BLOOD PRESSURE: 139 MMHG | DIASTOLIC BLOOD PRESSURE: 72 MMHG

## 2018-01-07 LAB
ANION GAP SERPL CALCULATED.3IONS-SCNC: 13.4 MMOL/L (ref 8–16)
BASOPHILS # BLD AUTO: 0.2 K/UL (ref 0–0.22)
BASOPHILS NFR BLD AUTO: 2.2 % (ref 0–2)
BUN SERPL-MCNC: 13 MG/DL (ref 7–18)
CHLORIDE SERPL-SCNC: 107 MMOL/L (ref 98–107)
CO2 SERPL-SCNC: 23.7 MMOL/L (ref 21–32)
CREAT SERPL-MCNC: 0.8 MG/DL (ref 0.7–1.3)
EOSINOPHIL # BLD AUTO: 0.3 K/UL (ref 0–0.4)
EOSINOPHIL NFR BLD AUTO: 3.7 % (ref 0–4)
ERYTHROCYTE [DISTWIDTH] IN BLOOD BY AUTOMATED COUNT: 15.4 % (ref 11.6–13.7)
GFR SERPL CREATININE-BSD FRML MDRD: 129 ML/MIN (ref 90–?)
GLUCOSE SERPL-MCNC: 98 MG/DL (ref 74–106)
HCT VFR BLD AUTO: 35.2 % (ref 36–52)
HGB BLD-MCNC: 11.5 G/DL (ref 12–18)
LYMPHOCYTES # BLD AUTO: 1.4 K/UL (ref 2–11.5)
LYMPHOCYTES NFR BLD AUTO: 17.7 % (ref 20.5–51.1)
MAGNESIUM SERPL-MCNC: 2 MG/DL (ref 1.8–2.4)
MCH RBC QN AUTO: 27 PG (ref 27–31)
MCHC RBC AUTO-ENTMCNC: 33 G/DL (ref 33–37)
MCV RBC AUTO: 83 FL (ref 80–94)
MONOCYTES # BLD AUTO: 0.8 K/UL (ref 0.8–1)
MONOCYTES NFR BLD AUTO: 9.6 % (ref 1.7–9.3)
NEUTROPHILS # BLD AUTO: 5.1 K/UL (ref 1.8–7.7)
NEUTROPHILS NFR BLD AUTO: 66.8 % (ref 42.2–75.2)
PHOSPHATE SERPL-MCNC: 4.6 MG/DL (ref 2.5–4.9)
PLATELET # BLD AUTO: 254 K/UL (ref 140–450)
POTASSIUM SERPL-SCNC: 4.1 MMOL/L (ref 3.5–5.1)
RBC # BLD AUTO: 4.22 MIL/UL (ref 4.2–6.1)
SODIUM SERPL-SCNC: 140 MMOL/L (ref 136–145)
WBC # BLD AUTO: 7.8 K/UL (ref 4.8–10.8)

## 2018-01-07 PROCEDURE — 0DJD8ZZ INSPECTION OF LOWER INTESTINAL TRACT, VIA NATURAL OR ARTIFICIAL OPENING ENDOSCOPIC: ICD-10-PCS

## 2018-01-07 RX ADMIN — DEXTROSE SCH MG: 50 INJECTION, SOLUTION INTRAVENOUS at 18:23

## 2018-01-07 RX ADMIN — Medication SCH GM: at 06:05

## 2018-01-07 RX ADMIN — Medication SCH EACH: at 09:04

## 2018-01-07 RX ADMIN — PANTOPRAZOLE SODIUM SCH MG: 40 TABLET, DELAYED RELEASE ORAL at 06:05

## 2018-01-07 RX ADMIN — Medication SCH DEV: at 11:30

## 2018-01-07 RX ADMIN — SODIUM CHLORIDE SCH MLS/HR: 9 INJECTION, SOLUTION INTRAVENOUS at 01:09

## 2018-01-07 RX ADMIN — VALSARTAN SCH MG: 80 TABLET ORAL at 09:05

## 2018-01-07 RX ADMIN — SENNOSIDES A AND B SCH MG: 8.6 TABLET, FILM COATED ORAL at 09:00

## 2018-01-07 RX ADMIN — SENNOSIDES A AND B SCH MG: 8.6 TABLET, FILM COATED ORAL at 13:00

## 2018-01-07 RX ADMIN — Medication SCH DEV: at 06:05

## 2018-01-07 RX ADMIN — DEXTROSE SCH MG: 50 INJECTION, SOLUTION INTRAVENOUS at 00:35

## 2018-01-07 RX ADMIN — METOPROLOL SUCCINATE SCH MG: 50 TABLET, EXTENDED RELEASE ORAL at 09:06

## 2018-01-07 RX ADMIN — SERTRALINE HYDROCHLORIDE SCH MG: 50 TABLET ORAL at 09:05

## 2018-01-07 RX ADMIN — ZOLPIDEM TARTRATE SCH MG: 5 TABLET ORAL at 21:11

## 2018-01-07 RX ADMIN — Medication SCH MG: at 11:30

## 2018-01-07 RX ADMIN — DEXTROSE SCH MG: 50 INJECTION, SOLUTION INTRAVENOUS at 12:00

## 2018-01-07 RX ADMIN — Medication SCH DEV: at 16:50

## 2018-01-07 RX ADMIN — Medication SCH MG: at 06:05

## 2018-01-07 RX ADMIN — Medication SCH MG: at 09:04

## 2018-01-07 RX ADMIN — ATORVASTATIN CALCIUM SCH MG: 20 TABLET, FILM COATED ORAL at 09:05

## 2018-01-07 RX ADMIN — FERROUS SULFATE TAB 325 MG (65 MG ELEMENTAL FE) SCH MG: 325 (65 FE) TAB at 08:00

## 2018-01-07 RX ADMIN — DEXTROSE SCH MG: 50 INJECTION, SOLUTION INTRAVENOUS at 06:04

## 2018-01-07 RX ADMIN — Medication SCH DEV: at 21:15

## 2018-01-07 RX ADMIN — POLYETHYLENE GLYCOL 3350 SCH GM: 17 POWDER, FOR SOLUTION ORAL at 09:00

## 2018-01-07 RX ADMIN — METRONIDAZOLE SCH MLS/HR: 500 SOLUTION INTRAVENOUS at 05:26

## 2018-01-07 RX ADMIN — LACTULOSE SCH GM: 10 SOLUTION ORAL at 09:00

## 2018-01-07 RX ADMIN — POTASSIUM CHLORIDE SCH MEQ: 750 TABLET, FILM COATED, EXTENDED RELEASE ORAL at 09:05

## 2018-01-07 RX ADMIN — AMITRIPTYLINE HYDROCHLORIDE SCH MG: 25 TABLET, FILM COATED ORAL at 21:11

## 2018-01-07 NOTE — NUR
REPORT RECEIVED FROM NIGHT SHIFT, PT SLEEPING QUIETLY IN NAD, RESP EVEN UNLABORED ON ROOM 
AIR, SKIN WARM DRY COLOR WNL, PT DENIES PAIN OR DISCOMFORT AT THIS TIME, PLAN OF CARE 
REVIEWED, NO IMMEDIATE NEEDS IDENTIFIED, CALL BELL WITHIN REACH, SIDE RAILS UP, BED LOCKED 
IN LOW POSITION, WILL CONTINUE TO MONITOR.

## 2018-01-07 NOTE — NUR
PREMIER IN TO TRANSPORT PATIENT. VITAL SIGNS STABLE. PATIENT OFF UNIT VIA GURNEY. NO SIGNS 
OR SYMPTOMS OF ACUTE DISTRESS NOTED. SAFETY MEASURES ENSURED. IV TAKEN OUT. TIP INTACT.

## 2018-01-07 NOTE — NUR
RECEIVED HANDOFF REPORT FROM AM RN. PATIENT A&OX4. PATIENT DENIES PAIN. PATIENT STATES 
ANXIETY REGARDING TRANSFER TO SNF. EDUCATION PROVIDED REGARDING CONTINUITY OF CARE. PATIENT 
IV PATENT AND INTACT. NO SIGNS OR SYMPTOMS OF ACUTE DISTRESS NOTED. CALL LIGHT WITHIN REACH. 
WILL CONTINUE TO MONITOR.

## 2018-01-07 NOTE — NUR
PATIENT WAS CLEANED HIS STOOL IS YELLOW CLEAR AT THIS TIME PATIENT WAS TURNED AND 
REPOSITIONED WITH THE ASSISTANCE OF WILLAM RUSH.IVF INFUSING WELL IV SITE PATENT. PATIENT 
CONTINUES TO BE NPO. PATIENT REMAINS CALM AND COOPERATIVE HAS NO COMPLAINS OF PAIN AND NO 
S/S OF HYPOGLYCEMIA NOTED AT THIS TIME.CALL LIGHT WITHIN REACH.

## 2018-01-07 NOTE — NUR
PT SITTIGN UP EATING DINNER, BLOOD SUGAR CHECKED AGAIN 145 NOW, VANCO GIVEN AS PER ORDER, PT 
UPDATED WITH PLAN TO TRANSFER TO [facility] AROUND 9PM, PT AGREEABLE WITH PLAN, 
WILL CONTINUE TO [facility].

## 2018-01-07 NOTE — NUR
PT RESTING QUIELTLY, AM MEDS GIVEN AS PER ORDER, PT REFUSED LAXATIVES AND STOOL SOFTNER, PT 
WITH CLEAR YELLOW STOOL AFTER BOWEL PREP LAST NIGHT, DENIES ANY IMMEDIATE NEEDS, WILL 
CONTINUE TO MONITOR.

## 2018-01-07 NOTE — NUR
PATIENT STABLE REPORT ENDORSED TO SHAW NELSON AT BEDSIDE SHE WILL RESUME CARE OF THE PATIENT. 
PATIENT STOOL IS  CURRENTLY YELLOW CLEAR AND PATIENT IS NOT ON SURGICAL BOARD YET. SHAW NELSON 
WILL RESUME CARE.

## 2018-01-07 NOTE — NUR
PT BACK FROM OR, PT AAOX4, RESP EVEN UNLABORED, SPEAKS CLEARLY, VITALS STABLE, WILL CONTINUE 
TO MONITOR.

## 2018-02-26 ENCOUNTER — HOSPITAL ENCOUNTER (EMERGENCY)
Dept: HOSPITAL 36 - ER | Age: 58
LOS: 1 days | Discharge: HOME | End: 2018-02-27
Payer: MEDICAID

## 2018-02-26 DIAGNOSIS — K85.90: ICD-10-CM

## 2018-02-26 DIAGNOSIS — R10.9: Primary | ICD-10-CM

## 2018-02-26 DIAGNOSIS — G89.29: ICD-10-CM

## 2018-02-26 DIAGNOSIS — K59.00: ICD-10-CM

## 2018-02-26 LAB
ALBUMIN SERPL-MCNC: 4.1 GM/DL (ref 4.2–5.5)
ALBUMIN/GLOB SERPL: 1.6 {RATIO} (ref 1–1.8)
ALP SERPL-CCNC: 63 U/L (ref 34–104)
ALT SERPL-CCNC: 15 U/L (ref 7–52)
AMYLASE SERPL-CCNC: 29 U/L (ref 29–103)
ANION GAP SERPL CALC-SCNC: 11 MMOL/L (ref 7–16)
AST SERPL-CCNC: 12 U/L (ref 13–39)
BASOPHILS # BLD AUTO: 0.1 TH/CUMM (ref 0–0.2)
BASOPHILS NFR BLD AUTO: 0.8 % (ref 0–2)
BILIRUB SERPL-MCNC: 0.4 MG/DL (ref 0.3–1)
BUN SERPL-MCNC: 25 MG/DL (ref 7–25)
CALCIUM SERPL-MCNC: 9.4 MG/DL (ref 8.6–10.3)
CHLORIDE SERPL-SCNC: 103 MEQ/L (ref 98–107)
CHOLEST SERPL-MCNC: 155 MG/DL (ref ?–200)
CK SERPL-CCNC: 28 U/L (ref 30–223)
CO2 SERPL-SCNC: 26.2 MEQ/L (ref 21–31)
CREAT SERPL-MCNC: 0.9 MG/DL (ref 0.7–1.3)
EOSINOPHIL # BLD AUTO: 0.3 TH/CMM (ref 0.1–0.4)
EOSINOPHIL NFR BLD AUTO: 2.2 % (ref 0–5)
ERYTHROCYTE [DISTWIDTH] IN BLOOD BY AUTOMATED COUNT: 14.1 % (ref 11.5–20)
GLOBULIN SER-MCNC: 2.6 GM/DL
GLUCOSE SERPL-MCNC: 128 MG/DL (ref 70–105)
HCT VFR BLD CALC: 37.2 % (ref 41–60)
HDLC SERPL-MCNC: 42 MG/DL (ref 23–92)
HGB BLD-MCNC: 12.3 GM/DL (ref 12–16)
INR PPP: 0.9 (ref 0.5–1.4)
LIPASE SERPL-CCNC: 177 U/L (ref 11–82)
LYMPHOCYTE AB SER FC-ACNC: 2.5 TH/CMM (ref 1.5–3)
LYMPHOCYTES NFR BLD AUTO: 21.2 % (ref 20–50)
MCH RBC QN AUTO: 27.2 PG (ref 26–30)
MCHC RBC AUTO-ENTMCNC: 33 PG (ref 28–36)
MCV RBC AUTO: 82.5 FL (ref 80–99)
MONOCYTES # BLD AUTO: 0.7 TH/CMM (ref 0.3–1)
MONOCYTES NFR BLD AUTO: 6.4 % (ref 2–10)
NEUTROPHILS # BLD: 8 TH/CMM (ref 1.8–8)
NEUTROPHILS NFR BLD AUTO: 69.4 % (ref 40–80)
PLATELET # BLD: 334 TH/CMM (ref 150–400)
PMV BLD AUTO: 7.7 FL
POTASSIUM SERPL-SCNC: 4.2 MEQ/L (ref 3.5–5.1)
PROTHROMBIN TIME: 9.4 SECONDS (ref 9.5–11.5)
RBC # BLD AUTO: 4.51 MIL/CMM (ref 4.3–5.7)
SODIUM SERPL-SCNC: 136 MEQ/L (ref 136–145)
TRIGL SERPL-MCNC: 161 MG/DL (ref ?–150)
WBC # BLD AUTO: 11.6 TH/CMM (ref 4.8–10.8)

## 2018-02-26 NOTE — ED PHYSICIAN CHART
ED Chief Complaint/HPI





- Patient Information


Date Seen:: 02/26/18


Time Seen:: 17:00


Chief Complaint:: Abdominal Pain


History of Present Illness:: 





onset x one day of intermittent, diffuse, crampy abdominal pain radiating to 

the chest; pt denies trauma, H/As, S/T, neck pain, C/P, SOB, cough, A/N/V/D/C, 

fever, chills, or urinary s/s


Allergies:: 


 Allergies











Allergy/AdvReac Type Severity Reaction Status Date / Time


 


Citrus And Derivatives Allergy   Verified 02/26/18 17:01


 


erythromycin base Allergy   Verified 02/26/18 17:02











Historian:: Patient, EMS


Review:: Nurse's Note Reviewed, EMS run form Reviewed





ED Review of Systems





- Review of Systems


General/Constitutional: No fever, No chills, No weight loss, No weakness, No 

diaphoresis, No edema, No loss of appetite


Skin: No skin lesions, No rash, No bruising


Head: No headache, No light-headedness


Eyes: No loss of vision, No pain, No diplopia


ENT: No earache, No nasal drainage, No sore throat, No tinnitus


Neck: No neck pain, No swelling, No thyromegaly, No stiffness, No mass noted


Cardio Vascular: No chest pain, No palpitations, No PND, No orthopnea, No edema


Pulmonary: No SOB, No cough, No sputum, No wheezing


GI: Nausea, Vomiting, Diarrhea, Pain, No melena, No hematochezia, No 

constipation, No hematemesis


G/U: No dysuria, No frequency, No hematuria, No nacturia


Musculoskeletal: No bone or joint pain, No back pain, No muscle pain


Endocrine: No polyuria, No polydipsia


Psychiatric: No prior psych history, No depression, No anxiety, No suicidal 

ideation, No homicidal ideation, No auditory hallucination, No visual 

hallucination


Hematopoietic: No bruising, No lymphadenopathy


Allergic/Immuno: No urticaria, No angioedema


Neurological: No syncope, No focal symptoms, No weakness, No paresthesia, No 

headache, No seizure, No dizziness, No confusion, No vertigo





ED Past Medical History





- Past Medical History


Obtainable: Yes


Past Medical History: HTN, DM, Dyslipidemia


Family History: Diabetes Melitus, HTN


Social History: Non Smoker, No Drug Use, Single, Care Facility


Surgical History: other (BKA)


Psychiatricy History: None


Medication: Reviewed





ED Physical Exam





- Physical Examination


General/Constitutional: Awake, Well-developed, well-nourished, Alert, No 

distress, GCS 15, Non-toxic appearing, Ambulatory


Head: Atraumatic


Eyes: Lids, conjuctiva normal, PERRL, EOMI


Skin: Nl inspection, No rash, No skin lesions, No ecchymosis, Well hydrated, No 

lymphadenopathy


ENMT: External ears, nose nl, TM canals nl, Nasal exam nl, Lips, teeth, gums nl

, Oropharynx nl, Tonsils nl


Neck: Nontender, Full ROM w/o pain, No JVD, No nuchal rigidity, No bruit, No 

mass, No stridor


Respiratory: Nl effort/Exclusion, Clear to Auscultation, No Wheeze/Rhonchi/Rales


Cardio Vascular: RRR, No murmur, gallop, rubs, NL S1 S2, Carotid/Femoral/Distal 

pulses equal bilaterally


GI: No tenderness/rebounding/guarding, No organomegaly, No hernia, Normal BS's, 

Nondistended, No mass/bruits, No McBurney tenderness


: No CVA tenderness


Extremities: No tenderness or effusion, Full ROM, normal strength in all 

extremities, No edema, Normal digits & nails


Neuro/Psych: Alert/oriented, DTR's symmetric, Normal sensory exam, Normal motor 

strength, Judgement/insight normal, Mood normal, Normal gait, No focal deficits


Misc: Normal back, No paraspinal tenderness





ED Septic Shock





- .


Is Septic Shock (SBP<90, OR Lactate>4 mmol\L) present?: No

## 2018-02-27 NOTE — TRANSFER SUMMARY
DATE OF TRANSFER:  02/26/2018



ADDENDUM



The patient was initially seen by Dr. Jhon Rodriguez and patient of Dr. Garber.  I

spoke to him.  He advised that the patient to be admitted.  The patient has some

abdominal problem that needs to be worked up by gastroenterologist plus the

patient has infection around the colostomy bag in the belly.  I am not sure

whether the patient has pouchitis for which patient is admitted.  This patient

was in bed four seen by Dr. Michael Ferreira and the patient came with abdominal

pain and a lab workup was done.  The patient looks to be awake, alert, and

oriented and lab workup was just given to me showing BNP to be 14.4.  Protime to

be 9.4 within normal limits.  Troponin is within normal limits.  Electrolytes,

sodium 136, potassium 4.2, chloride 103, CO2 of 26.2, glucose is 128, BUN is 25,

creatinine is 0.9.  Calcium level is 9.4, total protein is 6.7, albumin is 4.1,

albumin globulin ratio is 2.6.  Bilirubin is 0.4, AST is 12, alkaline

phosphatase is 63, triglycerides 161, HDL is 42, LDL is 91.  The patient was

seen by Dr. Ferreira.  I briefly saw the patient, the lungs are found to be

clear.  Vital signs were 98.9, pulse is 72, respiration is 19, blood pressure

126/67.  Height is 5 feet 7 inches, weight is 174 pounds.  The patient's belly

was found to be soft.  There was a mild elevation of amylase was 29.  Lipase was

slightly elevated at 177, so the patient may be having mild pancreatitis.  If

needed as an outpatient in the nursing home the patient can have ultrasound of

the gallbladder to be done.  The patient had x-ray of the abdomen done, which

showed the patient has nonobstructive bowel gas pattern, colonic fecal residual

present, may represent clinical constipation and that is what the patient is

complaining of constipation.  The patient is sent home on nursing home to get

Colace 100 mg in the morning, 200 mg at nighttime, plus Senokot 2 tablets daily,

if constipation is not relieved then the patient can have Metamucil at nighttime

and for some pain he can have Norco for 5 days only, maximum I am giving one

tablet every 8 hourly for 5 days.  The patient had infection.  He said he has a

C. diff infection, so we will give the patient Flagyl 500 mg 3 times a day for

14 days and C. diff precaution could be provided to the patient.  Before patient

came here, he was taking his atorvastatin, metoprolol should be continued,

lactobacillus can be given as before, Zofran on a p.r.n. basis, Symbicort as

needed could be given.  Contact isolation by Dr. Garber was ordered.  So all the

previous medication can be continued.  By the time the patient will be seen

tomorrow by Dr. Garber, he can have the results from this institution faxed over

to him.



FINAL DIAGNOSES:

1.  Abdominal pain, chronic.

2.  Chronic constipation.

3.  C. diff infection.

4.  Mild pancreatitis.

5.  Cannot rule out if the patient has any gallstones or not, but outpatient

ultrasound could be done.



The patient will go back to the nursing home.  I spoke to Dr. Romo and he

advised that the patient can be sent back to the nursing home because there is

nothing severely acute going on.





DD: 02/26/2018 21:54

DT: 02/27/2018 00:01

JOB# [id number]  [id number]

## 2018-02-27 NOTE — DIAGNOSTIC IMAGING REPORT
Portable chest x-ray

Time: 1721 hours



History: Chest pain



Allowing for portable technique the heart size is normal. No focal

pulmonary parenchymal processes. No hilar or mediastinal abnormalities.



Impression: No acute abnormalities.

## 2019-12-15 ENCOUNTER — HOSPITAL ENCOUNTER (EMERGENCY)
Dept: HOSPITAL 4 - SED | Age: 59
Discharge: HOME | End: 2019-12-15
Payer: MEDICAID

## 2019-12-15 VITALS — BODY MASS INDEX: 59.47 KG/M2 | WEIGHT: 315 LBS | HEIGHT: 61 IN

## 2019-12-15 VITALS — SYSTOLIC BLOOD PRESSURE: 148 MMHG

## 2019-12-15 DIAGNOSIS — I10: ICD-10-CM

## 2019-12-15 DIAGNOSIS — D72.829: ICD-10-CM

## 2019-12-15 DIAGNOSIS — L89.899: Primary | ICD-10-CM

## 2019-12-15 DIAGNOSIS — Z88.1: ICD-10-CM

## 2019-12-15 DIAGNOSIS — J40: ICD-10-CM

## 2019-12-15 DIAGNOSIS — Z88.0: ICD-10-CM

## 2019-12-15 DIAGNOSIS — E11.9: ICD-10-CM

## 2019-12-15 DIAGNOSIS — R55: ICD-10-CM

## 2019-12-15 DIAGNOSIS — Z79.899: ICD-10-CM

## 2019-12-15 LAB
ALBUMIN SERPL BCP-MCNC: 3.6 G/DL (ref 3.4–4.8)
ALT SERPL W P-5'-P-CCNC: 27 U/L (ref 12–78)
ANION GAP SERPL CALCULATED.3IONS-SCNC: 9 MMOL/L (ref 5–15)
APPEARANCE UR: CLEAR
AST SERPL W P-5'-P-CCNC: 14 U/L (ref 10–37)
BACTERIA URNS QL MICRO: (no result) /HPF
BASOPHILS # BLD AUTO: 0.1 K/UL (ref 0–0.2)
BASOPHILS NFR BLD AUTO: 0.8 % (ref 0–2)
BILIRUB SERPL-MCNC: 0.5 MG/DL (ref 0–1)
BILIRUB UR QL STRIP: NEGATIVE
BUN SERPL-MCNC: 16 MG/DL (ref 8–21)
CALCIUM SERPL-MCNC: 8.1 MG/DL (ref 8.4–11)
CHLORIDE SERPL-SCNC: 103 MMOL/L (ref 98–107)
COLOR UR: YELLOW
CREAT SERPL-MCNC: 1.07 MG/DL (ref 0.55–1.3)
EOSINOPHIL # BLD AUTO: 0.2 K/UL (ref 0–0.4)
EOSINOPHIL NFR BLD AUTO: 1.6 % (ref 0–4)
ERYTHROCYTE [DISTWIDTH] IN BLOOD BY AUTOMATED COUNT: 15.5 % (ref 9–15)
GFR SERPL CREATININE-BSD FRML MDRD: 92 ML/MIN (ref 90–?)
GLUCOSE SERPL-MCNC: 210 MG/DL (ref 70–99)
GLUCOSE UR STRIP-MCNC: NEGATIVE MG/DL
HCT VFR BLD AUTO: 36.7 % (ref 36–54)
HGB BLD-MCNC: 12 G/DL (ref 14–18)
HGB UR QL STRIP: NEGATIVE
KETONES UR STRIP-MCNC: NEGATIVE MG/DL
LEUKOCYTE ESTERASE UR QL STRIP: NEGATIVE
LYMPHOCYTES # BLD AUTO: 1.5 K/UL (ref 1–5.5)
LYMPHOCYTES NFR BLD AUTO: 11.6 % (ref 20.5–51.5)
MCH RBC QN AUTO: 27 PG (ref 27–31)
MCHC RBC AUTO-ENTMCNC: 33 % (ref 32–36)
MCV RBC AUTO: 84 FL (ref 79–98)
MONOCYTES # BLD MANUAL: 0.5 K/UL (ref 0–1)
MONOCYTES # BLD MANUAL: 4.1 % (ref 1.7–9.3)
NEUTROPHILS # BLD AUTO: 10.7 K/UL (ref 1.8–7.7)
NEUTROPHILS NFR BLD AUTO: 81.9 % (ref 40–70)
NITRITE UR QL STRIP: NEGATIVE
PH UR STRIP: 6 [PH] (ref 5–8)
PLATELET # BLD AUTO: 324 K/UL (ref 130–430)
POTASSIUM SERPL-SCNC: 4.4 MMOL/L (ref 3.5–5.1)
PROT UR QL STRIP: (no result)
RBC # BLD AUTO: 4.37 MIL/UL (ref 4.2–6.2)
RBC #/AREA URNS HPF: (no result) /HPF (ref 0–3)
SODIUM SERPLBLD-SCNC: 139 MMOL/L (ref 136–145)
SP GR UR STRIP: 1.02 (ref 1–1.03)
UROBILINOGEN UR STRIP-MCNC: 0.2 MG/DL (ref 0.2–1)
WBC # BLD AUTO: 13 K/UL (ref 4.8–10.8)
WBC #/AREA URNS HPF: (no result) /HPF (ref 0–3)

## 2019-12-15 PROCEDURE — 84484 ASSAY OF TROPONIN QUANT: CPT

## 2019-12-15 PROCEDURE — 83880 ASSAY OF NATRIURETIC PEPTIDE: CPT

## 2019-12-15 PROCEDURE — 86710 INFLUENZA VIRUS ANTIBODY: CPT

## 2019-12-15 PROCEDURE — 93005 ELECTROCARDIOGRAM TRACING: CPT

## 2019-12-15 PROCEDURE — 96360 HYDRATION IV INFUSION INIT: CPT

## 2019-12-15 PROCEDURE — 87086 URINE CULTURE/COLONY COUNT: CPT

## 2019-12-15 PROCEDURE — 80053 COMPREHEN METABOLIC PANEL: CPT

## 2019-12-15 PROCEDURE — 71045 X-RAY EXAM CHEST 1 VIEW: CPT

## 2019-12-15 PROCEDURE — 83605 ASSAY OF LACTIC ACID: CPT

## 2019-12-15 PROCEDURE — 87040 BLOOD CULTURE FOR BACTERIA: CPT

## 2019-12-15 PROCEDURE — 85025 COMPLETE CBC W/AUTO DIFF WBC: CPT

## 2019-12-15 PROCEDURE — 36415 COLL VENOUS BLD VENIPUNCTURE: CPT

## 2019-12-15 PROCEDURE — 99284 EMERGENCY DEPT VISIT MOD MDM: CPT

## 2019-12-15 PROCEDURE — 81000 URINALYSIS NONAUTO W/SCOPE: CPT

## 2019-12-15 NOTE — NUR
Patient given written and verbal discharge instructions and verbalizes 
understanding.  ER MD discussed with patient the results and treatment 
provided. Patient in stable condition. ID arm band removed. IV catheter removed 
intact and dressing applied, no active bleeding.

Rx of  BACTRIM & PROMETHAZINE given. Patient educated on pain management and to 
follow up with PMD. Pain Scale 0/10 .

Opportunity for questions provided and answered. Medication side effect fact 
sheet provided.

## 2019-12-15 NOTE — NUR
Patient BIB [facility] c/o near syncope episode no trauma. Patient A&Ox4, skin pink 
and warm, afebrile, nausea, denies V/D, denies pain right BKA, skin wound to 
right calf. Patient states he had chills, nausea, dry heaves last night and 
nausea & chills continued today at [facility]. Patient has hx Diabetes & HTN, R BKA

## 2020-01-25 ENCOUNTER — HOSPITAL ENCOUNTER (EMERGENCY)
Dept: HOSPITAL 4 - SED | Age: 60
Discharge: HOME | End: 2020-01-25
Payer: MEDICAID

## 2020-01-25 VITALS — SYSTOLIC BLOOD PRESSURE: 126 MMHG

## 2020-01-25 VITALS — BODY MASS INDEX: 45.1 KG/M2 | WEIGHT: 315 LBS | HEIGHT: 70 IN

## 2020-01-25 VITALS — SYSTOLIC BLOOD PRESSURE: 147 MMHG

## 2020-01-25 DIAGNOSIS — Z88.0: ICD-10-CM

## 2020-01-25 DIAGNOSIS — Z88.1: ICD-10-CM

## 2020-01-25 DIAGNOSIS — A08.4: Primary | ICD-10-CM

## 2020-01-25 DIAGNOSIS — Z79.899: ICD-10-CM

## 2020-01-25 DIAGNOSIS — I10: ICD-10-CM

## 2020-01-25 DIAGNOSIS — Z79.84: ICD-10-CM

## 2020-01-25 DIAGNOSIS — E11.9: ICD-10-CM

## 2020-01-25 LAB
ALBUMIN SERPL BCP-MCNC: 3.5 G/DL (ref 3.4–4.8)
ALT SERPL W P-5'-P-CCNC: 24 U/L (ref 12–78)
ANION GAP SERPL CALCULATED.3IONS-SCNC: 6 MMOL/L (ref 5–15)
AST SERPL W P-5'-P-CCNC: 14 U/L (ref 10–37)
BASOPHILS # BLD AUTO: 0.1 K/UL (ref 0–0.2)
BASOPHILS NFR BLD AUTO: 0.5 % (ref 0–2)
BILIRUB SERPL-MCNC: 0.6 MG/DL (ref 0–1)
BUN SERPL-MCNC: 19 MG/DL (ref 8–21)
CALCIUM SERPL-MCNC: 8.2 MG/DL (ref 8.4–11)
CHLORIDE SERPL-SCNC: 102 MMOL/L (ref 98–107)
CREAT SERPL-MCNC: 0.98 MG/DL (ref 0.55–1.3)
EOSINOPHIL # BLD AUTO: 0.1 K/UL (ref 0–0.4)
EOSINOPHIL NFR BLD AUTO: 0.7 % (ref 0–4)
ERYTHROCYTE [DISTWIDTH] IN BLOOD BY AUTOMATED COUNT: 16.2 % (ref 9–15)
GFR SERPL CREATININE-BSD FRML MDRD: 101 ML/MIN (ref 90–?)
GLUCOSE SERPL-MCNC: 153 MG/DL (ref 70–99)
HCT VFR BLD AUTO: 37.2 % (ref 36–54)
HGB BLD-MCNC: 12 G/DL (ref 14–18)
LYMPHOCYTES # BLD AUTO: 0.6 K/UL (ref 1–5.5)
LYMPHOCYTES NFR BLD AUTO: 5.9 % (ref 20.5–51.5)
MCH RBC QN AUTO: 27 PG (ref 27–31)
MCHC RBC AUTO-ENTMCNC: 32 % (ref 32–36)
MCV RBC AUTO: 84 FL (ref 79–98)
MONOCYTES # BLD MANUAL: 0.5 K/UL (ref 0–1)
MONOCYTES # BLD MANUAL: 4.9 % (ref 1.7–9.3)
NEUTROPHILS # BLD AUTO: 9.5 K/UL (ref 1.8–7.7)
NEUTROPHILS NFR BLD AUTO: 88 % (ref 40–70)
PLATELET # BLD AUTO: 285 K/UL (ref 130–430)
POTASSIUM SERPL-SCNC: 3.9 MMOL/L (ref 3.5–5.1)
RBC # BLD AUTO: 4.44 MIL/UL (ref 4.2–6.2)
SODIUM SERPLBLD-SCNC: 136 MMOL/L (ref 136–145)
WBC # BLD AUTO: 10.8 K/UL (ref 4.8–10.8)

## 2020-01-25 PROCEDURE — 83605 ASSAY OF LACTIC ACID: CPT

## 2020-01-25 PROCEDURE — 36415 COLL VENOUS BLD VENIPUNCTURE: CPT

## 2020-01-25 PROCEDURE — 93005 ELECTROCARDIOGRAM TRACING: CPT

## 2020-01-25 PROCEDURE — 74176 CT ABD & PELVIS W/O CONTRAST: CPT

## 2020-01-25 PROCEDURE — 85379 FIBRIN DEGRADATION QUANT: CPT

## 2020-01-25 PROCEDURE — 84484 ASSAY OF TROPONIN QUANT: CPT

## 2020-01-25 PROCEDURE — 85025 COMPLETE CBC W/AUTO DIFF WBC: CPT

## 2020-01-25 PROCEDURE — 99284 EMERGENCY DEPT VISIT MOD MDM: CPT

## 2020-01-25 PROCEDURE — 71045 X-RAY EXAM CHEST 1 VIEW: CPT

## 2020-01-25 PROCEDURE — 80053 COMPREHEN METABOLIC PANEL: CPT

## 2020-01-25 PROCEDURE — 96374 THER/PROPH/DIAG INJ IV PUSH: CPT

## 2020-01-25 PROCEDURE — 83880 ASSAY OF NATRIURETIC PEPTIDE: CPT

## 2020-01-25 NOTE — NUR
Patient AAO x 4 BIB BLS via [facility] to ER bed 05 with complaints of 8/10 
RLQ abdominal pain, cough/congestion, and constipation since last night. 
Reports history of DM2, gallbladder removal, and R BKA. Was seen at [facility] x 2 
weeks ago for r/o appendicitis and was discharged. He is unable to ambulate and 
uses Even chest rise and fall with respirations. Will continue to monitor.

## 2020-01-25 NOTE — NUR
Patient given written and verbal discharge instructions and verbalizes 
understanding.  ER MD Allen discussed with patient the results and treatment 
provided. Patient in stable condition. ID arm band removed. IV catheter removed 
intact and dressing applied, no active bleeding. Rx of Zofran given. Patient 
educated on pain management and to follow up with PMD. Pain Scale 0. 
Opportunity for questions provided and answered. Medication side effect fact 
sheet provided.

## 2020-01-25 NOTE — NUR
Placed in room 05. Placed on cardiac monitor, blood pressure machine, and pulse 
oximeter. To gown for exam. Side rails up.

## 2020-06-28 ENCOUNTER — HOSPITAL ENCOUNTER (EMERGENCY)
Dept: HOSPITAL 4 - SED | Age: 60
Discharge: HOME | End: 2020-06-28
Payer: MEDICAID

## 2020-06-28 VITALS — BODY MASS INDEX: 44.1 KG/M2 | HEIGHT: 71 IN | SYSTOLIC BLOOD PRESSURE: 175 MMHG | WEIGHT: 315 LBS

## 2020-06-28 VITALS — SYSTOLIC BLOOD PRESSURE: 147 MMHG

## 2020-06-28 DIAGNOSIS — R51: ICD-10-CM

## 2020-06-28 DIAGNOSIS — F17.200: ICD-10-CM

## 2020-06-28 DIAGNOSIS — E11.29: ICD-10-CM

## 2020-06-28 DIAGNOSIS — R53.1: ICD-10-CM

## 2020-06-28 DIAGNOSIS — R42: ICD-10-CM

## 2020-06-28 DIAGNOSIS — Z79.899: ICD-10-CM

## 2020-06-28 DIAGNOSIS — F41.9: Primary | ICD-10-CM

## 2020-06-28 DIAGNOSIS — E78.5: ICD-10-CM

## 2020-06-28 DIAGNOSIS — I50.9: ICD-10-CM

## 2020-06-28 DIAGNOSIS — Z88.0: ICD-10-CM

## 2020-06-28 DIAGNOSIS — N28.9: ICD-10-CM

## 2020-06-28 DIAGNOSIS — Z88.1: ICD-10-CM

## 2020-06-28 DIAGNOSIS — I11.0: ICD-10-CM

## 2020-06-28 LAB
ALBUMIN SERPL BCP-MCNC: 3.4 G/DL (ref 3.4–4.8)
ALT SERPL W P-5'-P-CCNC: 28 U/L (ref 12–78)
AMYLASE SERPL-CCNC: 27 U/L (ref 0–100)
ANION GAP SERPL CALCULATED.3IONS-SCNC: 9 MMOL/L (ref 5–15)
APPEARANCE UR: CLEAR
AST SERPL W P-5'-P-CCNC: 14 U/L (ref 10–37)
BASOPHILS # BLD AUTO: 0.1 K/UL (ref 0–0.2)
BASOPHILS NFR BLD AUTO: 0.9 % (ref 0–2)
BILIRUB SERPL-MCNC: 0.5 MG/DL (ref 0–1)
BILIRUB UR QL STRIP: NEGATIVE
BUN SERPL-MCNC: 22 MG/DL (ref 8–21)
CALCIUM SERPL-MCNC: 8.4 MG/DL (ref 8.4–11)
CHLORIDE SERPL-SCNC: 104 MMOL/L (ref 98–107)
COLOR UR: YELLOW
CREAT SERPL-MCNC: 1.32 MG/DL (ref 0.55–1.3)
EOSINOPHIL # BLD AUTO: 0.2 K/UL (ref 0–0.4)
EOSINOPHIL NFR BLD AUTO: 1.7 % (ref 0–4)
ERYTHROCYTE [DISTWIDTH] IN BLOOD BY AUTOMATED COUNT: 16 % (ref 9–15)
GFR SERPL CREATININE-BSD FRML MDRD: 72 ML/MIN (ref 90–?)
GLUCOSE SERPL-MCNC: 203 MG/DL (ref 70–99)
GLUCOSE UR STRIP-MCNC: NEGATIVE MG/DL
HCT VFR BLD AUTO: 36.8 % (ref 36–54)
HGB BLD-MCNC: 11.8 G/DL (ref 14–18)
HGB UR QL STRIP: NEGATIVE
INR PPP: 1 (ref 0.8–1.2)
KETONES UR STRIP-MCNC: NEGATIVE MG/DL
LEUKOCYTE ESTERASE UR QL STRIP: NEGATIVE
LIPASE SERPL-CCNC: 83 U/L (ref 73–393)
LYMPHOCYTES # BLD AUTO: 1.4 K/UL (ref 1–5.5)
LYMPHOCYTES NFR BLD AUTO: 12 % (ref 20.5–51.5)
MCH RBC QN AUTO: 27 PG (ref 27–31)
MCHC RBC AUTO-ENTMCNC: 32 % (ref 32–36)
MCV RBC AUTO: 84 FL (ref 79–98)
MONOCYTES # BLD MANUAL: 0.6 K/UL (ref 0–1)
MONOCYTES # BLD MANUAL: 4.9 % (ref 1.7–9.3)
NEUTROPHILS # BLD AUTO: 9.4 K/UL (ref 1.8–7.7)
NEUTROPHILS NFR BLD AUTO: 80.5 % (ref 40–70)
NITRITE UR QL STRIP: NEGATIVE
PH UR STRIP: 7 [PH] (ref 5–8)
PLATELET # BLD AUTO: 262 K/UL (ref 130–430)
POTASSIUM SERPL-SCNC: 4 MMOL/L (ref 3.5–5.1)
PROT UR QL STRIP: NEGATIVE
PROTHROMBIN TIME: 9.9 SECS (ref 9.5–12.5)
RBC # BLD AUTO: 4.41 MIL/UL (ref 4.2–6.2)
SODIUM SERPLBLD-SCNC: 138 MMOL/L (ref 136–145)
SP GR UR STRIP: 1.02 (ref 1–1.03)
T4 FREE SERPL-MCNC: 1.3 NG/DL (ref 0.8–1.5)
TSH SERPL DL<=0.05 MIU/L-ACNC: 2.38 UIU/ML (ref 0.36–3.74)
UROBILINOGEN UR STRIP-MCNC: 0.2 MG/DL (ref 0.2–1)
WBC # BLD AUTO: 11.7 K/UL (ref 4.8–10.8)

## 2020-06-28 PROCEDURE — 86710 INFLUENZA VIRUS ANTIBODY: CPT

## 2020-06-28 PROCEDURE — 84443 ASSAY THYROID STIM HORMONE: CPT

## 2020-06-28 PROCEDURE — 36415 COLL VENOUS BLD VENIPUNCTURE: CPT

## 2020-06-28 PROCEDURE — 71045 X-RAY EXAM CHEST 1 VIEW: CPT

## 2020-06-28 PROCEDURE — 82150 ASSAY OF AMYLASE: CPT

## 2020-06-28 PROCEDURE — 81003 URINALYSIS AUTO W/O SCOPE: CPT

## 2020-06-28 PROCEDURE — 82550 ASSAY OF CK (CPK): CPT

## 2020-06-28 PROCEDURE — 84484 ASSAY OF TROPONIN QUANT: CPT

## 2020-06-28 PROCEDURE — 85025 COMPLETE CBC W/AUTO DIFF WBC: CPT

## 2020-06-28 PROCEDURE — 80053 COMPREHEN METABOLIC PANEL: CPT

## 2020-06-28 PROCEDURE — 96375 TX/PRO/DX INJ NEW DRUG ADDON: CPT

## 2020-06-28 PROCEDURE — 84439 ASSAY OF FREE THYROXINE: CPT

## 2020-06-28 PROCEDURE — 96361 HYDRATE IV INFUSION ADD-ON: CPT

## 2020-06-28 PROCEDURE — 83690 ASSAY OF LIPASE: CPT

## 2020-06-28 PROCEDURE — 85610 PROTHROMBIN TIME: CPT

## 2020-06-28 PROCEDURE — 99285 EMERGENCY DEPT VISIT HI MDM: CPT

## 2020-06-28 PROCEDURE — 84479 ASSAY OF THYROID (T3 OR T4): CPT

## 2020-06-28 PROCEDURE — 83880 ASSAY OF NATRIURETIC PEPTIDE: CPT

## 2020-06-28 PROCEDURE — 96374 THER/PROPH/DIAG INJ IV PUSH: CPT

## 2020-06-28 PROCEDURE — 83605 ASSAY OF LACTIC ACID: CPT

## 2020-06-28 PROCEDURE — 87040 BLOOD CULTURE FOR BACTERIA: CPT

## 2020-06-28 PROCEDURE — 85730 THROMBOPLASTIN TIME PARTIAL: CPT

## 2020-06-28 PROCEDURE — 93005 ELECTROCARDIOGRAM TRACING: CPT

## 2020-06-28 NOTE — NUR
PT PRESENTED TO ER C/O GENERALIZED WEAKNESS. PT BIB BLS FROM HOME A&OX4, 
AFEBRILE, SKIN PINK & WARM, PAIN 9/10, NAUSEA, DENIES V/D, RIGHT BELOW KNEE 
AMPUTATION WITH PROSTHETIC IN PLACE. PT STATES HE HAD "HIGH" BP AT HOME CHECKED 
ON HOME BP MONITOR, HEAD AND GENERAL WEAKNESS.

## 2022-10-05 NOTE — NUR
1/16/18 RD INITIAL ASSESSMENT COMPLETED

PLEASE REFER TO NUTRITION ASSESSMENT UNDER CARE ACTIVITY FOR ESTIMATED NUTRITIONAL NEEDS. 



RD RECOMMENDATIONS:



1. CONTINUE CLEAR LIQUID DIET AS TOLERATED.

-NOTE PT WAS TOLERATING FULL LIQUID PREVIOUSLY AND HAS GOOD APPETITE. 



2. IF/WHEN PT IS MEDICALLY STABLE TO ADVANCE DIET, CONSIDER CCHO 60 GM D/T PT WITH PMH OF 
DM. 



3. RD WILL F/U 3-5 DAYS; MODERATE RISK. 



JOE MORA, RD Consent: Written consent was obtained and risks were reviewed including but not limited to scarring, infection, bleeding, scabbing, incomplete removal, nerve damage and allergy to anesthesia.